# Patient Record
Sex: FEMALE | Race: ASIAN | NOT HISPANIC OR LATINO | Employment: FULL TIME | URBAN - METROPOLITAN AREA
[De-identification: names, ages, dates, MRNs, and addresses within clinical notes are randomized per-mention and may not be internally consistent; named-entity substitution may affect disease eponyms.]

---

## 2017-03-21 ENCOUNTER — LAB VISIT (OUTPATIENT)
Dept: LAB | Facility: OTHER | Age: 32
End: 2017-03-21
Attending: OBSTETRICS & GYNECOLOGY
Payer: COMMERCIAL

## 2017-03-21 ENCOUNTER — OFFICE VISIT (OUTPATIENT)
Dept: OBSTETRICS AND GYNECOLOGY | Facility: CLINIC | Age: 32
End: 2017-03-21
Payer: COMMERCIAL

## 2017-03-21 VITALS
DIASTOLIC BLOOD PRESSURE: 70 MMHG | BODY MASS INDEX: 31.24 KG/M2 | HEIGHT: 64 IN | WEIGHT: 183 LBS | SYSTOLIC BLOOD PRESSURE: 116 MMHG

## 2017-03-21 DIAGNOSIS — N91.2 AMENORRHEA: ICD-10-CM

## 2017-03-21 DIAGNOSIS — Z32.01 PREGNANCY CONFIRMED BY POSITIVE URINE TEST: ICD-10-CM

## 2017-03-21 DIAGNOSIS — Z32.01 PREGNANCY CONFIRMED BY POSITIVE URINE TEST: Primary | ICD-10-CM

## 2017-03-21 LAB
ABO + RH BLD: NORMAL
BASOPHILS # BLD AUTO: 0.03 K/UL
BASOPHILS NFR BLD: 0.3 %
BLD GP AB SCN CELLS X3 SERPL QL: NORMAL
DIFFERENTIAL METHOD: ABNORMAL
EOSINOPHIL # BLD AUTO: 0.1 K/UL
EOSINOPHIL NFR BLD: 1.3 %
ERYTHROCYTE [DISTWIDTH] IN BLOOD BY AUTOMATED COUNT: 15 %
HCT VFR BLD AUTO: 39.6 %
HGB BLD-MCNC: 13.1 G/DL
LYMPHOCYTES # BLD AUTO: 2.7 K/UL
LYMPHOCYTES NFR BLD: 29.5 %
MCH RBC QN AUTO: 25.5 PG
MCHC RBC AUTO-ENTMCNC: 33.1 %
MCV RBC AUTO: 77 FL
MONOCYTES # BLD AUTO: 0.5 K/UL
MONOCYTES NFR BLD: 5.3 %
NEUTROPHILS # BLD AUTO: 5.7 K/UL
NEUTROPHILS NFR BLD: 63.5 %
PLATELET # BLD AUTO: 236 K/UL
PMV BLD AUTO: 10.5 FL
RBC # BLD AUTO: 5.13 M/UL
WBC # BLD AUTO: 8.99 K/UL

## 2017-03-21 PROCEDURE — 86703 HIV-1/HIV-2 1 RESULT ANTBDY: CPT

## 2017-03-21 PROCEDURE — 87340 HEPATITIS B SURFACE AG IA: CPT

## 2017-03-21 PROCEDURE — 36415 COLL VENOUS BLD VENIPUNCTURE: CPT

## 2017-03-21 PROCEDURE — 83020 HEMOGLOBIN ELECTROPHORESIS: CPT

## 2017-03-21 PROCEDURE — 99999 PR PBB SHADOW E&M-NEW PATIENT-LVL III: CPT | Mod: PBBFAC,,, | Performed by: OBSTETRICS & GYNECOLOGY

## 2017-03-21 PROCEDURE — 85025 COMPLETE CBC W/AUTO DIFF WBC: CPT

## 2017-03-21 PROCEDURE — 87591 N.GONORRHOEAE DNA AMP PROB: CPT

## 2017-03-21 PROCEDURE — 81220 CFTR GENE COM VARIANTS: CPT

## 2017-03-21 PROCEDURE — 86592 SYPHILIS TEST NON-TREP QUAL: CPT

## 2017-03-21 PROCEDURE — 86900 BLOOD TYPING SEROLOGIC ABO: CPT

## 2017-03-21 PROCEDURE — 99385 PREV VISIT NEW AGE 18-39: CPT | Mod: S$GLB,,, | Performed by: OBSTETRICS & GYNECOLOGY

## 2017-03-21 PROCEDURE — 88175 CYTOPATH C/V AUTO FLUID REDO: CPT

## 2017-03-21 PROCEDURE — 86901 BLOOD TYPING SEROLOGIC RH(D): CPT

## 2017-03-21 PROCEDURE — 86762 RUBELLA ANTIBODY: CPT

## 2017-03-21 NOTE — PROGRESS NOTES
Assigned: Dating performed on 04/10/2017, based on the LMP  Assigned GA 8 w + 4 d  Assigned HERB: 11/16/2017      PT HERE WITH + UPT. LMP 02/09/17. FIRST ANNUAL EXAM  NO NAUSEA OR VOMITING FEELS WELL.  SLIGHT TIRED.    ROS:  GENERAL: No fever, chills, fatigability or weight loss.  VULVAR: No pain, no lesions and no itching.  VAGINAL: No relaxation, no itching, no discharge, no abnormal bleeding and no lesions.  ABDOMEN: No abdominal pain. Denies nausea. Denies vomiting. No diarrhea. No constipation  BREAST: Denies pain. No lumps. No discharge.  URINARY: No incontinence, no nocturia, no frequency and no dysuria.  CARDIOVASCULAR: No chest pain. No shortness of breath. No leg cramps.  NEUROLOGICAL: No headaches. No vision changes.  The remainder of the review of systems was negative.    PE:  General Appearance: overweight And Well developed. Well nourished. In no acute distress.  Vulva: Lesions: No.  Urethral Meatus: Normal size. Normal location. No lesions. No prolapse.  Urethra: No masses. No tenderness. No prolapse. No scarring.  Bladder: No masses. No tenderness.  Vagina: Mucosa NI:yes discharge no, atrophy no, cystocele no or rectocele no.  Cervix: Lesion: no  Stenotic: no Cervical motion tenderness: no  Uterus: Uterus size: 5 weeks. Support good. Uterus size: Normal  Adnexa: Masses: No Tenderness: No CDS Nodularity: No  Abdomen: overweight No masses. No tenderness.  Breasts: No bilateral masses. No bilateral discharge. No bilateral tenderness. No bilateral fibrocystic changes.  Neck: No thyroid enlargement. No thyroid masses.  Skin: Rashes: No  CHEST: CTA B  HEART: RRR  EXT: NO EDEMA        PROCEDURES:  UPT-    PLAN:     DIAGNOSIS:  1. Pregnancy confirmed by positive urine test    2. Amenorrhea        MEDICATIONS & ORDERS:  Orders Placed This Encounter    C. trachomatis/N. gonorrhoeae by AMP DNA Cervix    HIV-1 and HIV-2 antibodies    RPR    Hepatitis B surface antigen    Rubella antibody, IgG    CBC auto  differential    Cystic Fibrosis Screen    Hemoglobin Electrophoresis,Hgb A2 Amandeep.    Type & Screen    Liquid-based pap smear, screening    PNV 11-iron fum-folic acid-om3 (VIVA DHA) 28-1-200 mg Cap    US OB/GYN Procedure ([facility])         FOLLOW-UP: With me in 1 month

## 2017-03-22 LAB
C TRACH DNA SPEC QL NAA+PROBE: NOT DETECTED
HBV SURFACE AG SERPL QL IA: NEGATIVE
HGB A2 MFR BLD HPLC: 2.3 %
HGB FRACT BLD ELPH-IMP: NORMAL
HGB FRACT BLD ELPH-IMP: NORMAL
HIV 1+2 AB+HIV1 P24 AG SERPL QL IA: NEGATIVE
N GONORRHOEA DNA SPEC QL NAA+PROBE: NOT DETECTED
RPR SER QL: NORMAL
RUBV IGG SER-ACNC: 87.7 IU/ML
RUBV IGG SER-IMP: REACTIVE

## 2017-03-24 LAB — CFTR MUT ANL BLD/T: NORMAL

## 2017-03-27 ENCOUNTER — PATIENT MESSAGE (OUTPATIENT)
Dept: OBSTETRICS AND GYNECOLOGY | Facility: CLINIC | Age: 32
End: 2017-03-27

## 2017-04-10 ENCOUNTER — PROCEDURE VISIT (OUTPATIENT)
Dept: OBSTETRICS AND GYNECOLOGY | Facility: CLINIC | Age: 32
End: 2017-04-10
Payer: COMMERCIAL

## 2017-04-10 ENCOUNTER — PATIENT MESSAGE (OUTPATIENT)
Dept: OBSTETRICS AND GYNECOLOGY | Facility: CLINIC | Age: 32
End: 2017-04-10

## 2017-04-10 DIAGNOSIS — Z32.01 PREGNANCY CONFIRMED BY POSITIVE URINE TEST: ICD-10-CM

## 2017-04-10 PROCEDURE — 76815 OB US LIMITED FETUS(S): CPT | Mod: S$GLB,,, | Performed by: PEDIATRICS

## 2017-04-10 PROCEDURE — 76817 TRANSVAGINAL US OBSTETRIC: CPT | Mod: S$GLB,,, | Performed by: PEDIATRICS

## 2017-04-10 NOTE — PROCEDURES
Procedures     Indication  ========    Estimation of gestational age.    Method  ======    Transvaginal ultrasound examination. View: Sufficient.    Pregnancy  =========    Nelson pregnancy. Number of fetuses: 1.    Dating  ======    LMP on: 2/9/2017  GA by LMP 8 w + 4 d  HERB by LMP: 11/16/2017  Ultrasound examination on: 4/10/2017  GA by U/S based upon: CRL  GA by U/S 8 w + 0 d  HERB by U/S: 11/20/2017  Assigned: Dating performed on 04/10/2017, based on the LMP  Assigned GA 8 w + 4 d  Assigned HERB: 11/16/2017    Assessment  ==========    Gestational sac: visualized  Yolk sac: visualized  Embryo: visualized  CRL 15.6 mm 5% 8w 0d Hadlock  Cardiac activity: present   bpm    Maternal Structures  ===============    Uterus / Cervix  Uterus: Normal  Ovaries / Tubes / Adnexa  Rt ovary: Normal  Rt ovary D1 3.1 cm  Rt ovary D2 1.7 cm  Rt ovary D3 2.1 cm  Rt ovary mean 2.3 cm  Rt ovary vol 5.5 cm³  Lt ovary: Normal  Lt ovary D1 2.7 cm  Lt ovary D2 2.2 cm  Lt ovary D3 1.9 cm  Lt ovary mean 2.2 cm  Lt ovary vol 5.7 cm³    Impression  =========    Single viable intrauterine pregnancy consistent with LMP dating.  Embryo grossly WNL with normal cardiac activity.  Normal uterus, cervix and adnexae as noted above.  No fluid seen in cul-de-sac.    Recommendation  ==============    Follow-up as clinically indicated.

## 2017-04-15 ENCOUNTER — PATIENT MESSAGE (OUTPATIENT)
Dept: OBSTETRICS AND GYNECOLOGY | Facility: CLINIC | Age: 32
End: 2017-04-15

## 2017-04-17 RX ORDER — DOXYLAMINE SUCCINATE AND PYRIDOXINE HYDROCHLORIDE, DELAYED RELEASE TABLETS 10 MG/10 MG 10; 10 MG/1; MG/1
1 TABLET, DELAYED RELEASE ORAL 3 TIMES DAILY
Qty: 100 TABLET | Refills: 3 | Status: SHIPPED | OUTPATIENT
Start: 2017-04-17 | End: 2017-05-10

## 2017-04-18 ENCOUNTER — ROUTINE PRENATAL (OUTPATIENT)
Dept: OBSTETRICS AND GYNECOLOGY | Facility: CLINIC | Age: 32
End: 2017-04-18
Payer: COMMERCIAL

## 2017-04-18 VITALS — SYSTOLIC BLOOD PRESSURE: 110 MMHG | DIASTOLIC BLOOD PRESSURE: 60 MMHG | WEIGHT: 174.19 LBS | BODY MASS INDEX: 29.9 KG/M2

## 2017-04-18 DIAGNOSIS — Z34.90 PREGNANCY, UNSPECIFIED GESTATIONAL AGE: ICD-10-CM

## 2017-04-18 PROCEDURE — 0502F SUBSEQUENT PRENATAL CARE: CPT | Mod: S$GLB,,, | Performed by: OBSTETRICS & GYNECOLOGY

## 2017-04-18 PROCEDURE — 99999 PR PBB SHADOW E&M-EST. PATIENT-LVL I: CPT | Mod: PBBFAC,,, | Performed by: OBSTETRICS & GYNECOLOGY

## 2017-04-19 ENCOUNTER — PATIENT MESSAGE (OUTPATIENT)
Dept: OBSTETRICS AND GYNECOLOGY | Facility: CLINIC | Age: 32
End: 2017-04-19

## 2017-05-04 ENCOUNTER — LAB VISIT (OUTPATIENT)
Dept: LAB | Facility: OTHER | Age: 32
End: 2017-05-04
Attending: OBSTETRICS & GYNECOLOGY
Payer: COMMERCIAL

## 2017-05-04 ENCOUNTER — OFFICE VISIT (OUTPATIENT)
Dept: MATERNAL FETAL MEDICINE | Facility: CLINIC | Age: 32
End: 2017-05-04
Payer: COMMERCIAL

## 2017-05-04 VITALS — BODY MASS INDEX: 29.67 KG/M2 | WEIGHT: 172.81 LBS

## 2017-05-04 DIAGNOSIS — Z36.89 ENCOUNTER FOR FETAL ANATOMIC SURVEY: Primary | ICD-10-CM

## 2017-05-04 DIAGNOSIS — Z36.82 ENCOUNTER FOR (NT) NUCHAL TRANSLUCENCY SCAN: ICD-10-CM

## 2017-05-04 DIAGNOSIS — Z34.90 PREGNANCY, UNSPECIFIED GESTATIONAL AGE: ICD-10-CM

## 2017-05-04 DIAGNOSIS — Z36.82 ENCOUNTER FOR NUCHAL TRANSLUCENCY TESTING: ICD-10-CM

## 2017-05-04 PROCEDURE — 84163 PAPPA SERUM: CPT

## 2017-05-04 PROCEDURE — 76801 OB US < 14 WKS SINGLE FETUS: CPT | Mod: S$GLB,,, | Performed by: PEDIATRICS

## 2017-05-04 PROCEDURE — 76813 OB US NUCHAL MEAS 1 GEST: CPT | Mod: S$GLB,,, | Performed by: PEDIATRICS

## 2017-05-04 PROCEDURE — 99499 UNLISTED E&M SERVICE: CPT | Mod: S$GLB,,, | Performed by: PEDIATRICS

## 2017-05-04 PROCEDURE — 36415 COLL VENOUS BLD VENIPUNCTURE: CPT

## 2017-05-04 NOTE — PROGRESS NOTES
Indication  ========    First trimester screening.    Maternal Assessment  =================    Weight 78 kg  Weight (lb) 172 lb    Method  ======    Transabdominal ultrasound examination. View: Good view.    Pregnancy  =========    Nelson pregnancy. Number of fetuses: 1.    Dating  ======    Cycle: regular cycle  Ultrasound examination on: 5/4/2017  GA by U/S based upon: CRL  GA by U/S 12 w + 0 d  HERB by U/S: 11/16/2017  Assigned: Dating performed on 04/10/2017, based on the LMP  Assigned GA 12 w + 0 d  Assigned HERB: 11/16/2017    General Evaluation  ==============    Cardiac activity: present.  Amniotic fluid: normal amount.    Fetal Biometry  ============    CRL 53.1 mm 12w 0d Hadlock   bpm    Fetal Anatomy  ============     Urogenital tract: Bladder not visualized    The following structures appear normal:  Cranium. Neck. Thorax. Abdominal wall.    The following structures were visualized:  GI tract. Arms. Legs.    Maternal Structures  ===============    Uterus / Cervix  Uterus: Normal  Ovaries / Tubes / Adnexa  Rt ovary: Visualized  Rt ovary D1 3.2 cm  Rt ovary D2 1.8 cm  Rt ovary D3 2.1 cm  Rt ovary mean 2.3 cm  Rt ovary vol 6.1 cm³  Lt ovary: Visualized  Lt ovary D1 2.9 cm  Lt ovary D2 2.0 cm  Lt ovary D3 1.9 cm  Lt ovary mean 2.2 cm  Lt ovary vol 5.6 cm³  Pouch of Ryder / Other Structures  Cul de Sac: Visualized      Impression  =========    Fetal size is consistent with established dating, and normal fetal heart motion is seen. The nuchal translucency could not be accurately  measured for the Sequential Screen. A sample of maternal blood will be sent today for the first portion of the integrated screen.          Recommendation  ==============    First portion of integrated screening completed today. Results to be sent to primary pregnancy care provider. Recommend to complete second  blood draw beyond 15 weeks EGA of pregnancy. Ultrasound for fetal anatomical survey scheduled by [facility] today for  19-20 weeks EGA.    Thank you for allowing us to participate in the care of your patients. If you have any questions concerning today's consultation feel free to  contact me or one of my partners. We can be reached at [phone] during normal business hours. If you have a question after normal  business hours, please contact Labor and Delivery [phone] and the unit secretary will page our on call physician.

## 2017-05-08 ENCOUNTER — PATIENT MESSAGE (OUTPATIENT)
Dept: OBSTETRICS AND GYNECOLOGY | Facility: CLINIC | Age: 32
End: 2017-05-08

## 2017-05-08 LAB — MATERNAL INTEGRATED SCREEN PART 1: NORMAL

## 2017-05-10 ENCOUNTER — ROUTINE PRENATAL (OUTPATIENT)
Dept: OBSTETRICS AND GYNECOLOGY | Facility: CLINIC | Age: 32
End: 2017-05-10
Payer: COMMERCIAL

## 2017-05-10 VITALS
DIASTOLIC BLOOD PRESSURE: 68 MMHG | BODY MASS INDEX: 29.74 KG/M2 | SYSTOLIC BLOOD PRESSURE: 102 MMHG | WEIGHT: 173.31 LBS

## 2017-05-10 DIAGNOSIS — Z3A.12 12 WEEKS GESTATION OF PREGNANCY: Primary | ICD-10-CM

## 2017-05-10 PROCEDURE — 0502F SUBSEQUENT PRENATAL CARE: CPT | Mod: S$GLB,,, | Performed by: OBSTETRICS & GYNECOLOGY

## 2017-05-10 PROCEDURE — 99999 PR PBB SHADOW E&M-EST. PATIENT-LVL II: CPT | Mod: PBBFAC,,, | Performed by: OBSTETRICS & GYNECOLOGY

## 2017-05-10 RX ORDER — PROMETHAZINE HYDROCHLORIDE 25 MG/1
25 TABLET ORAL EVERY 4 HOURS
Qty: 30 TABLET | Refills: 3 | Status: SHIPPED | OUTPATIENT
Start: 2017-05-10 | End: 2017-09-15

## 2017-06-02 ENCOUNTER — PATIENT MESSAGE (OUTPATIENT)
Dept: OBSTETRICS AND GYNECOLOGY | Facility: CLINIC | Age: 32
End: 2017-06-02

## 2017-06-13 ENCOUNTER — ROUTINE PRENATAL (OUTPATIENT)
Dept: OBSTETRICS AND GYNECOLOGY | Facility: CLINIC | Age: 32
End: 2017-06-13
Payer: COMMERCIAL

## 2017-06-13 VITALS
BODY MASS INDEX: 30.27 KG/M2 | WEIGHT: 176.38 LBS | SYSTOLIC BLOOD PRESSURE: 110 MMHG | DIASTOLIC BLOOD PRESSURE: 70 MMHG

## 2017-06-13 DIAGNOSIS — Z3A.17 17 WEEKS GESTATION OF PREGNANCY: Primary | ICD-10-CM

## 2017-06-13 PROCEDURE — 99999 PR PBB SHADOW E&M-EST. PATIENT-LVL I: CPT | Mod: PBBFAC,,, | Performed by: OBSTETRICS & GYNECOLOGY

## 2017-06-13 PROCEDURE — 0502F SUBSEQUENT PRENATAL CARE: CPT | Mod: S$GLB,,, | Performed by: OBSTETRICS & GYNECOLOGY

## 2017-06-13 NOTE — PROGRESS NOTES
BACK FROM [place] 10 DAYS AGO. HAD SPOTTING AFTER FLIGHT AND YESTERDAY. NO CRAMPS. FEELS BETTER. EATING AND NO FOOD AVERSIONS.   SVE: LTC. NO LESION. NO D/C OR BLOOD. NT II

## 2017-06-16 ENCOUNTER — TELEPHONE (OUTPATIENT)
Dept: OBSTETRICS AND GYNECOLOGY | Facility: CLINIC | Age: 32
End: 2017-06-16

## 2017-06-16 NOTE — TELEPHONE ENCOUNTER
----- Message from Vicenta Zabala LPN sent at 6/15/2017 11:18 AM CDT -----  Contact: Patient      ----- Message -----  From: Flory Mendoza  Sent: 6/15/2017  10:19 AM  To: Silverio FERNANDEZ Jr Staff    x_ 1st Request  _ 2nd Request  _ 3rd Request    Who: ABIGAIL NANCE [[id number]]    Why: Patient is calling in regards to her refill prenatal vitamins Rx. Patient is needing the RX to be called in to her pharmacy. [facility] [address]. [phone]    What Number to Call Back: [phone]    When to Expect a call back: (Before the end of the day)  -- if call after 3:00 call back will be tomorrow.

## 2017-06-19 ENCOUNTER — PATIENT MESSAGE (OUTPATIENT)
Dept: OBSTETRICS AND GYNECOLOGY | Facility: CLINIC | Age: 32
End: 2017-06-19

## 2017-07-03 ENCOUNTER — OFFICE VISIT (OUTPATIENT)
Dept: MATERNAL FETAL MEDICINE | Facility: CLINIC | Age: 32
End: 2017-07-03
Payer: COMMERCIAL

## 2017-07-03 DIAGNOSIS — Z36.9 ENCOUNTER FOR FETAL ULTRASOUND: Primary | ICD-10-CM

## 2017-07-03 DIAGNOSIS — O26.879 SHORT CERVIX AFFECTING PREGNANCY: ICD-10-CM

## 2017-07-03 DIAGNOSIS — Z36.89 ENCOUNTER FOR FETAL ANATOMIC SURVEY: ICD-10-CM

## 2017-07-03 PROCEDURE — 76805 OB US >/= 14 WKS SNGL FETUS: CPT | Mod: S$GLB,,, | Performed by: OBSTETRICS & GYNECOLOGY

## 2017-07-03 PROCEDURE — 76817 TRANSVAGINAL US OBSTETRIC: CPT | Mod: S$GLB,,, | Performed by: OBSTETRICS & GYNECOLOGY

## 2017-07-03 PROCEDURE — 99243 OFF/OP CNSLTJ NEW/EST LOW 30: CPT | Mod: 25,S$GLB,, | Performed by: OBSTETRICS & GYNECOLOGY

## 2017-07-03 RX ORDER — PROGESTERONE 200 MG/1
200 CAPSULE ORAL NIGHTLY
Qty: 30 CAPSULE | Refills: 10 | Status: ON HOLD | OUTPATIENT
Start: 2017-07-03 | End: 2017-11-15 | Stop reason: HOSPADM

## 2017-07-03 NOTE — PROGRESS NOTES
Indication  ========    Fetal anatomy survey.    History  ======    General History  Other: CHANDRAKANT GODINEZ,  Previous Outcomes   1  Para 0    Pregnancy History  ==============    Maternal Lab Tests  Test: integrated Screen  Result: negative; 1:19,000  Wants to know gender: no    Method  ======    Transabdominal ultrasound examination. View: Good view.    Pregnancy  =========    Nelson pregnancy. Number of fetuses: 1.    Dating  ======    Cycle: regular cycle  Ultrasound examination on: 7/3/2017  GA by U/S based upon: AC, BPD, Femur, HC  GA by U/S 20 w + 0 d  HERB by U/S: 2017  Assigned: Dating performed on 04/10/2017, based on the LMP  Assigned GA 20 w + 4 d  Assigned HERB: 2017    General Evaluation  ==============    Cardiac activity: present.  bpm.  Fetal movements: visualized.  Presentation: cephalic.  Placenta:  Placental site: posterior.  Umbilical cord: Cord vessels: 3 vessel cord.  Amniotic fluid: Amount of AF: normal amount.    Fetal Biometry  ============    Fetal Biometry  BPD 47.4 mm 20w 2d Hadlock  OFD 61.2 mm 21w 0d John  .9 mm 20w 1d Hadlock  .7 mm 19w 3d Hadlock  Femur 33.0 mm 20w 2d Hadlock  Cerebellum tr 19.8 mm 19w 5d Us  CM 5.4 mm  Humerus 31.4 mm 20w 3d John   g 15% Sherwin  Calculated by: Hadlock (BPD-HC-AC-FL)  EFW (lb) 0 lb  EFW (oz) 11 oz  Cephalic index 0.77  HC / AC 1.25  FL / BPD 0.70  FL / AC 0.23   bpm  Head / Face / Neck   4.5 mm    Fetal Anatomy  ===========    Cranium: normal  Lateral ventricles: normal  Choroid plexus: normal  Midline falx: normal  Cavum septi pellucidi: normal  Cerebellum: normal  Cisterna magna: normal  Lips: normal  Profile: normal  Nose: normal  RVOT: normal  LVOT: normal  4-chamber view: 4-chamber normal, septum normal  Situs: normal  Aortic arch: normal  Ductal arch: suboptimal  SVC: suboptimal  IVC: suboptimal  3-vessel view: normal  Cord  insertion: normal  Stomach: normal  Kidneys: normal  Bladder: normal  Genitals: normal  Cervical spine: normal  Thoracic spine: normal  Lumbar spine: normal  Sacral spine: normal  Arms: both visualized  Legs: both visualized  Rt upper arm: normal  Rt forearm: normal  Rt hand: visualized  Rt hand: open  Lt upper arm: normal  Lt forearm: normal  Lt hand: visualized  Lt hand: closed  Rt upper leg: normal  Rt lower leg: normal  Rt foot: visualized  Rt foot: plantar surface wnl  Lt upper leg: normal  Lt lower leg: normal  Lt foot: visualized  Lt foot: plantar surface wnl  Wants to know gender: no    Maternal Structures  ===============    Uterus / Cervix  Funneling: Funneling present  Approach: w/o pressure  Cervical length 27.0 mm  Second approach: with pressure  Cervical length (2) 16.0 mm  Funnelling cerclage approach: Transvaginal  Cervical length with fundal pressure 16.0 mm  Funnel width with fundal pressure 13.0 mm  Funnel length with fundal pressure 6.0 mm  Ovaries / Tubes / Adnexa  Rt ovary: Visualized  Lt ovary: Visualized    Consultation  ==========    Type: Short Cervix.  The patient reported a small amount of vaginal spotting a few weeks ago and was examined by her primary ob. She denies having any vaginal  discharge, leakage of fluid, cramping, or contractions. She reports a rare pain more on left that radiates to vagina, similar to round ligament  pain. She denies any urinary symptoms. She denies having any cervical surgeries.    Speculum exam: No bulging membranes  Digital exam: Closed/50/high    We reviewed the potential implications of a short cervix. We discussed the increased risk of  birth. I reviewed with her the decreased  incidence of  birth in nulliparous women who were given nightly vaginal progesterone (prometrium 200mg). I also recommended to the  patient the avoidance of sexual intercourse and exertional activity. I reviewed the signs and symptoms of  labor in depth. We  discussed  the concepts of cervical insufficiency and  labor. We discussed that based on her current cervical length, closed cervix, and no history  of a prior  birth that she is not currently a cerclage candidate. The patient was advised of the thresholds of viability. We discussed that if  the cervix were to open or become extremely short, cerclage would be considered. The risks of cerclage were briefly reviewed. We also  discussed that cerclage is not typically placed beyond 23-24 weeks of gestation depending on clinical scenario. We also reviewed the   risks associated with  delivery.    The patient's job involves sitting and thus she can still continue with this.    A total of 30 minutes was spent in face to face time with greater than half of that time spent in counseling and coordination of care.        Impression  =========    Nelson live intrauterine pregnancy.  Biometry agrees with the clinical dating.  Normal amniotic fluid volume by qualitative assessment.  Placenta is posterior without evidence of previa.  Some of the anatomy was suboptimally visualized. The anatomy that was seen today appeared normal.  On transvaginal ultrasound, the cervix appeared short, measuring 16mm in its shortest length. A funnel was noted.    Recommendation  ==============    1. Follow up ultrasound in one week for cervical length.  2. Follow up ultrasound in 2 weeks for cervical length.  3. Follow up ultrasound in 4 weeks for growth and missing anatomy.  4. Prometrium 200mg per vagina qhs.

## 2017-07-03 NOTE — LETTER
July 3, 2017      Alan Sawyer Jr., MD  [address]  [address]  [place] [facility] [address]           [facility]  [address]  [facility] [address]  Phone: [phone]          Patient: Yvette Reyes   MR Number: [id number]   YOB: 1985   Date of Visit: 7/3/2017       Dear Dr. Alan Sawyer Jr.:    Thank you for referring Yvette Reyes to me for evaluation. Attached you will find relevant portions of my assessment and plan of care.    If you have questions, please do not hesitate to call me. I look forward to following Yvette Reyes along with you.    Sincerely,    Laurel Armstrong MD    Enclosure  CC:  No Recipients    If you would like to receive this communication electronically, please contact [email or web address] or [phone] to request more information on [employer] Link access.    For providers and/or their staff who would like to refer a patient to [facility], please contact us through our one-stop-shop provider referral line, [facility], at [phone].    If you feel you have received this communication in error or would no longer like to receive these types of communications, please e-mail [email or web address]

## 2017-07-05 ENCOUNTER — PATIENT MESSAGE (OUTPATIENT)
Dept: OBSTETRICS AND GYNECOLOGY | Facility: CLINIC | Age: 32
End: 2017-07-05

## 2017-07-10 ENCOUNTER — OFFICE VISIT (OUTPATIENT)
Dept: MATERNAL FETAL MEDICINE | Facility: CLINIC | Age: 32
End: 2017-07-10
Payer: COMMERCIAL

## 2017-07-10 DIAGNOSIS — O34.32 CERVICAL INCOMPETENCE AFFECTING MANAGEMENT OF PREGNANCY IN SECOND TRIMESTER, ANTEPARTUM: ICD-10-CM

## 2017-07-10 DIAGNOSIS — Z36.9 ENCOUNTER FOR FETAL ULTRASOUND: ICD-10-CM

## 2017-07-10 PROCEDURE — 76817 TRANSVAGINAL US OBSTETRIC: CPT | Mod: S$GLB,,, | Performed by: OBSTETRICS & GYNECOLOGY

## 2017-07-10 PROCEDURE — 76815 OB US LIMITED FETUS(S): CPT | Mod: S$GLB,,, | Performed by: OBSTETRICS & GYNECOLOGY

## 2017-07-10 PROCEDURE — 99213 OFFICE O/P EST LOW 20 MIN: CPT | Mod: 25,S$GLB,, | Performed by: OBSTETRICS & GYNECOLOGY

## 2017-07-10 NOTE — LETTER
July 10, 2017      Alan Sawyer Jr., MD  [address]  [address]  [place] [facility] [address]           [facility]  [address]  [facility] [address]  Phone: [phone]          Patient: Yvette Reyes   MR Number: [id number]   YOB: 1985   Date of Visit: 7/10/2017       Dear Dr. Alan Sawyer Jr.:    Thank you for referring Yvette Reyes to me for evaluation. Attached you will find relevant portions of my assessment and plan of care.    If you have questions, please do not hesitate to call me. I look forward to following Yvette Reyes along with you.    Sincerely,    Jared Branch MD    Enclosure  CC:  No Recipients    If you would like to receive this communication electronically, please contact [email or web address] or [phone] to request more information on [employer] Link access.    For providers and/or their staff who would like to refer a patient to [facility], please contact us through our one-stop-shop provider referral line, [facility], at [phone].    If you feel you have received this communication in error or would no longer like to receive these types of communications, please e-mail [email or web address]

## 2017-07-10 NOTE — PROGRESS NOTES
Indication  ========    Cervical shortening.    History  ======    General History  Other: CHANDRAKANT GODINEZ,  Previous Outcomes   1  Para 0    Pregnancy History  ==============    Maternal Lab Tests  Test: integrated Screen  Result: negative; 1:19,000  Wants to know gender: no    Method  ======    Transabdominal and transvaginal ultrasound examination. View: Sufficient.    Pregnancy  =========    Nelson pregnancy. Number of fetuses: 1.    Dating  ======    Cycle: regular cycle  Assigned: Dating performed on 04/10/2017, based on the LMP  Assigned GA 21 w + 4 d  Assigned HERB: 2017    General Evaluation  ==============    Cardiac activity: present.  bpm.  Fetal movements: visualized.  Presentation: cephalic.  Placenta: posterior.  Amniotic fluid: normal amount.    Fetal Biometry  ============    Fetal Biometry  Calculated by: Hadlock (BPD-HC-AC-FL)   bpm    Fetal Anatomy  ============    SVC: normal  IVC: normal  Wants to know gender: no  Other: A full anatomic survey has been previously performed.    Maternal Structures  ===============    Uterus / Cervix  Funneling: Funneling absent  Approach: w/o pressure  Cervical length 20.9 mm  Second approach: with pressure  Cervical length (2) 17.0 mm  Other: Small Y-shaped funnel noted but no evidence of cervical dilation or membrane prolapse.    Consultation  ==========    Ms. Reyes returned today for follow-up after her diagnosis of a short cervix last week. Last week or shortness cervical length was 16 mm  and she was started on vaginal progesterone. She denies any problems since starting that treatment. She has not had any increase in  discharge and when she had any contractions. She denies any bleeding. She denies any leakage of fluid.    On ultrasound today without fundal pressure her cervical length is 21 mm in with fundal pressure is 17 mm. There is a very small and shallow  Y-shaped funnel noted. There is no evidence of cervical dilation  nor any evidence of membrane prolapse.    I encouraged her to continue the vaginal progesterone. We discussed again precautions and warning signs that should prompt reevaluation.  We discussed her activities as well as she had a number of questions about her posture. I explained that at this point as long as there is not  evidence of cervical dilation and her cervical length remains greater than 10 mm I do not see any indication for consideration of cerclage. She  has a follow-up appointment for cervical length and approximately 1-2 weeks.    Thank you for allowing me to participate in her care. If I can be of any further assistance or answer any questions please do not hesitate to  call.    I overall spent approximately 15 minutes in face to face time with the patient and her family, greater than 50% of which was in counseling and  care coordination.      Impression  =========    Stable cervical length with a shortened cervix measuring 20-17 mm currently on vaginal progesterone. Cervix appears closed.    Recommendation  ==============    1. Continue vaginal progesterone.  2. Repeat ultrasound for cervical length already scheduled for 1-2 weeks.  3. Precautions reviewed.

## 2017-07-20 ENCOUNTER — ROUTINE PRENATAL (OUTPATIENT)
Dept: OBSTETRICS AND GYNECOLOGY | Facility: CLINIC | Age: 32
End: 2017-07-20
Payer: COMMERCIAL

## 2017-07-20 VITALS — WEIGHT: 183 LBS | DIASTOLIC BLOOD PRESSURE: 72 MMHG | SYSTOLIC BLOOD PRESSURE: 120 MMHG | BODY MASS INDEX: 31.41 KG/M2

## 2017-07-20 DIAGNOSIS — O26.872 SHORT CERVIX IN SECOND TRIMESTER, ANTEPARTUM: ICD-10-CM

## 2017-07-20 DIAGNOSIS — Z3A.23 23 WEEKS GESTATION OF PREGNANCY: Primary | ICD-10-CM

## 2017-07-20 PROCEDURE — 0502F SUBSEQUENT PRENATAL CARE: CPT | Mod: S$GLB,,, | Performed by: OBSTETRICS & GYNECOLOGY

## 2017-07-20 PROCEDURE — 99999 PR PBB SHADOW E&M-EST. PATIENT-LVL I: CPT | Mod: PBBFAC,,, | Performed by: OBSTETRICS & GYNECOLOGY

## 2017-07-26 ENCOUNTER — OFFICE VISIT (OUTPATIENT)
Dept: MATERNAL FETAL MEDICINE | Facility: CLINIC | Age: 32
End: 2017-07-26
Payer: COMMERCIAL

## 2017-07-26 DIAGNOSIS — Z36.9 ENCOUNTER FOR FETAL ULTRASOUND: ICD-10-CM

## 2017-07-26 DIAGNOSIS — O26.872 SHORT CERVIX IN SECOND TRIMESTER, ANTEPARTUM: ICD-10-CM

## 2017-07-26 PROCEDURE — 76815 OB US LIMITED FETUS(S): CPT | Mod: S$GLB,,, | Performed by: OBSTETRICS & GYNECOLOGY

## 2017-07-26 PROCEDURE — 99213 OFFICE O/P EST LOW 20 MIN: CPT | Mod: 25,S$GLB,, | Performed by: OBSTETRICS & GYNECOLOGY

## 2017-07-26 PROCEDURE — 76817 TRANSVAGINAL US OBSTETRIC: CPT | Mod: S$GLB,,, | Performed by: OBSTETRICS & GYNECOLOGY

## 2017-07-26 NOTE — LETTER
July 28, 2017      Alan Sawyer Jr., MD  [address]  [address]  [place] [facility] [address]           [facility]  [address]  [facility] [address]  Phone: [phone]          Patient: Yvette Reyes   MR Number: [id number]   YOB: 1985   Date of Visit: 7/26/2017       Dear Dr. Alan Sawyer Jr.:    Thank you for referring Yvette Reyes to me for evaluation. Attached you will find relevant portions of my assessment and plan of care.    If you have questions, please do not hesitate to call me. I look forward to following Yvette Reyes along with you.    Sincerely,    Laurel Armstrong MD    Enclosure  CC:  No Recipients    If you would like to receive this communication electronically, please contact [email or web address] or [phone] to request more information on [employer] Link access.    For providers and/or their staff who would like to refer a patient to [facility], please contact us through our one-stop-shop provider referral line, [facility], at [phone].    If you feel you have received this communication in error or would no longer like to receive these types of communications, please e-mail [email or web address]

## 2017-07-28 NOTE — PROGRESS NOTES
Indication  ========    cervical length.    History  ======    General History  Other: CHANDRAKANT GODINEZ,  Previous Outcomes   1  Para 0    Pregnancy History  ==============    Maternal Lab Tests  Test: integrated Screen  Result: negative; 1:19,000  Wants to know gender: no    Method  ======    Transabdominal and transvaginal ultrasound examination, [place] E10. View: Sufficient.    Pregnancy  =========    Nelson pregnancy. Number of fetuses: 1.    Dating  ======    Cycle: regular cycle  Assigned: Dating performed on 04/10/2017, based on the LMP  Assigned GA 23 w + 6 d  Assigned HERB: 2017    General Evaluation  ==============    Cardiac activity: present.  bpm.  Fetal movements: visualized.  Presentation: cephalic.  Placenta:  Placental site: posterior.  Umbilical cord: Cord vessels: 3 vessel cord. Cord insertion: placental insertion: normal.  Amniotic fluid: Amount of AF: normal amount. MVP 5.9 cm.    Fetal Biometry  ============    Fetal Biometry  Calculated by: Hadlock (BPD-HC-AC-FL)  MVP 5.9 cm   bpm    Maternal Structures  ===============    Uterus / Cervix  Funneling: Funneling present  Cervix: Visualized  Approach: w/o pressure  Cervical length 16.4 mm  Funnelling cerclage approach: Transvaginal  Cervical length with fundal pressure 19.5 mm  Funnel width with fundal pressure 8.4 mm  Funnel width without fundal pressure 7.4 mm  Funnel length with fundal pressure 5.4 mm  Funnel length without fundal pressure 5.2 mm    Consultation  ==========    Type: Follow up short cervix.  The patient presents for follow up for a short cervix. She has no  labor complaints. She is doing well on vaginal progesterone. She  denies any vaginal bleeding, leakage of fluid, cramping, contractions, or increase in discharge.    Cvx digitally closed/50%/high    We discussed that given her gestational age that no further cervical lengths are needed. Treatment would be based on clinical signs and  symptoms  and physical exam. Continue vaginal progesterone and pelvic rest. PTL precautions given. Cerv ical length is stable and with closed  cervix and current cervical length, she is not a cerclage candidate.    A total of 15 minutes was spent with greater than half of that time spent in counseling and coordination of care.    Impression  =========    Nelson live intrauterine pregnancy.  Normal amniotic fluid volume.  Limited exam today for cervical length. The residual cervical length is stable from prior exams. The funnel is smaller than at her anatomy  ultrasound and not significantly different in appearance from her 7/10/17 ultrasound.    Recommendation  ==============    Follow up ultrasound in 2 weeks for growth and completion of anatomy.

## 2017-08-14 ENCOUNTER — OFFICE VISIT (OUTPATIENT)
Dept: MATERNAL FETAL MEDICINE | Facility: CLINIC | Age: 32
End: 2017-08-14
Attending: OBSTETRICS & GYNECOLOGY
Payer: COMMERCIAL

## 2017-08-14 DIAGNOSIS — Z36.4 ANTENATAL SCREENING FOR FETAL GROWTH RETARDATION USING ULTRASONICS: ICD-10-CM

## 2017-08-14 DIAGNOSIS — Z36.9 ENCOUNTER FOR FETAL ULTRASOUND: ICD-10-CM

## 2017-08-14 DIAGNOSIS — O99.891 CURRENT MATERNAL CONDITION AFFECTING PREGNANCY: ICD-10-CM

## 2017-08-14 DIAGNOSIS — O26.872 SHORT CERVIX IN SECOND TRIMESTER, ANTEPARTUM: ICD-10-CM

## 2017-08-14 PROCEDURE — 76816 OB US FOLLOW-UP PER FETUS: CPT | Mod: S$GLB,,, | Performed by: OBSTETRICS & GYNECOLOGY

## 2017-08-14 PROCEDURE — 99499 UNLISTED E&M SERVICE: CPT | Mod: S$GLB,,, | Performed by: OBSTETRICS & GYNECOLOGY

## 2017-08-14 NOTE — PROGRESS NOTES
Indication  ========    Follow-up evaluation of anatomy DA, and growth (Dr. Godinez).    History  ======    General History  Other: CHANDRAKANT GODINEZ,  Previous Outcomes   1  Para 0    Pregnancy History  ==============    Maternal Lab Tests  Test: integrated Screen  Result: negative; 1:19,000  Wants to know gender: yes    Method  ======    Transabdominal ultrasound examination. View: Sufficient.    Pregnancy  =========    Nelson pregnancy. Number of fetuses: 1.    Dating  ======    Cycle: regular cycle  Ultrasound examination on: 2017  GA by U/S based upon: AC, BPD, Femur, HC  GA by U/S 25 w + 2 d  HERB by U/S: 2017  Assigned: Dating performed on 04/10/2017, based on the LMP  Assigned GA 26 w + 4 d  Assigned HERB: 2017    General Evaluation  ==============    Cardiac activity: present.  bpm.  Fetal movements: visualized.  Presentation: breech.  Placenta:  Placental site: posterior.  Umbilical cord: Cord vessels: 3 vessel cord.  Amniotic fluid: Amount of AF: normal amount. MVP 4.9 cm.    Fetal Biometry  ============    Fetal Biometry  BPD 60.6 mm 24w 5d Hadlock  OFD 84.7 mm 27w 3d John  .9 mm 25w 4d Hadlock  .6 mm 25w 2d Hadlock  Femur 46.9 mm 25w 4d Hadlock  CM 4.9 mm   g 24% Sherwin  Calculated by: Hadlock (BPD-HC-AC-FL)  EFW (lb) 1 lb  EFW (oz) 12 oz  Cephalic index 0.72  HC / AC 1.13  FL / BPD 0.77  FL / AC 0.23  MVP 4.9 cm   bpm  Head / Face / Neck   3.5 mm    Fetal Anatomy  ============    Cranium: normal  Lateral ventricles: normal  Cerebellum: normal  Cisterna magna: normal  Lips: normal  Nose: normal  4-chamber view: 4-chamber normal, septum normal  Aortic arch: normal  Ductal arch: normal  Stomach: normal  Kidneys: normal  Bladder: normal  Genitals: normal  Gender: male  Wants to know gender: yes    Impression  =========    1. 26w 4d nelson intrauterine pregnancy  2. normal interval fetal growth with EFW = 807 gms at 24%  3. Amniotic fluid volume  appears wnl per qualitative assessment  4. Limited f/u anatomy: no abnormalities appreciated .    Recommendation  ==============    we will schedule patient for a f/u sono in about 6 wks for interval fetal growth, MVP, and overall fetal well being .

## 2017-08-14 NOTE — LETTER
August 14, 2017      Laurel Armstrong MD  [address]  [address]  [facility] [address]           [facility]  [address]  [facility] [address]  Phone: [phone]          Patient: Yvette Reyes   MR Number: [id number]   YOB: 1985   Date of Visit: 8/14/2017       Dear Dr. Laurel Armstrong:    Thank you for referring Yvette Reyes to me for evaluation. Attached you will find relevant portions of my assessment and plan of care.    If you have questions, please do not hesitate to call me. I look forward to following Yvette Reyes along with you.    Sincerely,    Latisha Villarreal MD    Enclosure  CC:  No Recipients    If you would like to receive this communication electronically, please contact [email or web address] or [phone] to request more information on [facility] access.    For providers and/or their staff who would like to refer a patient to [facility], please contact us through our one-stop-shop provider referral line, [facility], at [phone].    If you feel you have received this communication in error or would no longer like to receive these types of communications, please e-mail [email or web address]

## 2017-08-17 ENCOUNTER — LAB VISIT (OUTPATIENT)
Dept: LAB | Facility: OTHER | Age: 32
End: 2017-08-17
Attending: OBSTETRICS & GYNECOLOGY
Payer: COMMERCIAL

## 2017-08-17 ENCOUNTER — ROUTINE PRENATAL (OUTPATIENT)
Dept: OBSTETRICS AND GYNECOLOGY | Facility: CLINIC | Age: 32
End: 2017-08-17
Payer: COMMERCIAL

## 2017-08-17 ENCOUNTER — CLINICAL SUPPORT (OUTPATIENT)
Dept: OBSTETRICS AND GYNECOLOGY | Facility: CLINIC | Age: 32
End: 2017-08-17
Payer: COMMERCIAL

## 2017-08-17 VITALS
BODY MASS INDEX: 32.92 KG/M2 | SYSTOLIC BLOOD PRESSURE: 110 MMHG | WEIGHT: 191.81 LBS | DIASTOLIC BLOOD PRESSURE: 72 MMHG

## 2017-08-17 DIAGNOSIS — Z3A.27 27 WEEKS GESTATION OF PREGNANCY: Primary | ICD-10-CM

## 2017-08-17 DIAGNOSIS — Z3A.23 23 WEEKS GESTATION OF PREGNANCY: ICD-10-CM

## 2017-08-17 LAB
BASOPHILS # BLD AUTO: 0.03 K/UL
BASOPHILS NFR BLD: 0.3 %
DIFFERENTIAL METHOD: ABNORMAL
EOSINOPHIL # BLD AUTO: 0.1 K/UL
EOSINOPHIL NFR BLD: 1 %
ERYTHROCYTE [DISTWIDTH] IN BLOOD BY AUTOMATED COUNT: 14.8 %
GLUCOSE SERPL-MCNC: 113 MG/DL
HCT VFR BLD AUTO: 37.1 %
HGB BLD-MCNC: 12 G/DL
LYMPHOCYTES # BLD AUTO: 2.3 K/UL
LYMPHOCYTES NFR BLD: 19.9 %
MCH RBC QN AUTO: 27 PG
MCHC RBC AUTO-ENTMCNC: 32.3 G/DL
MCV RBC AUTO: 83 FL
MONOCYTES # BLD AUTO: 0.8 K/UL
MONOCYTES NFR BLD: 6.4 %
NEUTROPHILS # BLD AUTO: 8.4 K/UL
NEUTROPHILS NFR BLD: 71.9 %
PLATELET # BLD AUTO: 195 K/UL
PMV BLD AUTO: 11 FL
RBC # BLD AUTO: 4.45 M/UL
WBC # BLD AUTO: 11.68 K/UL

## 2017-08-17 PROCEDURE — 99999 PR PBB SHADOW E&M-EST. PATIENT-LVL II: CPT | Mod: PBBFAC,,, | Performed by: OBSTETRICS & GYNECOLOGY

## 2017-08-17 PROCEDURE — 99999 PR PBB SHADOW E&M-EST. PATIENT-LVL I: CPT | Mod: PBBFAC,,,

## 2017-08-17 PROCEDURE — 0502F SUBSEQUENT PRENATAL CARE: CPT | Mod: S$GLB,,, | Performed by: OBSTETRICS & GYNECOLOGY

## 2017-08-17 PROCEDURE — 85025 COMPLETE CBC W/AUTO DIFF WBC: CPT

## 2017-08-17 PROCEDURE — 36415 COLL VENOUS BLD VENIPUNCTURE: CPT

## 2017-08-17 PROCEDURE — 90715 TDAP VACCINE 7 YRS/> IM: CPT | Mod: S$GLB,,, | Performed by: OBSTETRICS & GYNECOLOGY

## 2017-08-17 PROCEDURE — 82950 GLUCOSE TEST: CPT

## 2017-08-17 PROCEDURE — 90471 IMMUNIZATION ADMIN: CPT | Mod: S$GLB,,, | Performed by: OBSTETRICS & GYNECOLOGY

## 2017-09-08 ENCOUNTER — ROUTINE PRENATAL (OUTPATIENT)
Dept: OBSTETRICS AND GYNECOLOGY | Facility: CLINIC | Age: 32
End: 2017-09-08
Payer: COMMERCIAL

## 2017-09-08 VITALS
DIASTOLIC BLOOD PRESSURE: 70 MMHG | BODY MASS INDEX: 33.53 KG/M2 | WEIGHT: 195.31 LBS | SYSTOLIC BLOOD PRESSURE: 114 MMHG

## 2017-09-08 DIAGNOSIS — Z3A.30 30 WEEKS GESTATION OF PREGNANCY: Primary | ICD-10-CM

## 2017-09-08 PROCEDURE — 0502F SUBSEQUENT PRENATAL CARE: CPT | Mod: S$GLB,,, | Performed by: OBSTETRICS & GYNECOLOGY

## 2017-09-08 PROCEDURE — 99999 PR PBB SHADOW E&M-EST. PATIENT-LVL II: CPT | Mod: PBBFAC,,, | Performed by: OBSTETRICS & GYNECOLOGY

## 2017-09-08 NOTE — PROGRESS NOTES
HAD A CALF CRAMP 3 NIGHTS AGO. ? HARD LUMP IN PERINEAL AREA    PE:  General Appearance: overweight And Well developed. Well nourished. In no acute distress.  Vulva: Lesions: YES        OPTION OF DRAINAGE OR EXP MGMT. PT WISHES TO WAIT

## 2017-09-11 ENCOUNTER — OFFICE VISIT (OUTPATIENT)
Dept: MATERNAL FETAL MEDICINE | Facility: CLINIC | Age: 32
End: 2017-09-11
Payer: COMMERCIAL

## 2017-09-11 DIAGNOSIS — Z03.74 ENCOUNTER FOR OBSERVATION OF SUSPECTED PROBLEM WITH FETAL GROWTH NOT FOUND: ICD-10-CM

## 2017-09-11 DIAGNOSIS — Z36.9 ENCOUNTER FOR FETAL ULTRASOUND: ICD-10-CM

## 2017-09-11 PROCEDURE — 76816 OB US FOLLOW-UP PER FETUS: CPT | Mod: S$GLB,,, | Performed by: OBSTETRICS & GYNECOLOGY

## 2017-09-11 PROCEDURE — 99499 UNLISTED E&M SERVICE: CPT | Mod: S$GLB,,, | Performed by: OBSTETRICS & GYNECOLOGY

## 2017-09-11 NOTE — PROGRESS NOTES
Indication  ========    Evaluation of fetal growth (AC).    History  ======    General History  Other: CHANDRAKANT GODINEZ,  Previous Outcomes   1  Para 0    Pregnancy History  ==============    Maternal Lab Tests  Test: integrated Screen  Result: negative; 1:19,000  Wants to know gender: yes    Method  ======    Transabdominal ultrasound examination. View: Sufficient.    Pregnancy  =========    Nelson pregnancy. Number of fetuses: 1.    Dating  ======    Cycle: regular cycle  Ultrasound examination on: 2017  GA by U/S based upon: AC, BPD, Femur, HC  GA by U/S 30 w + 1 d  HERB by U/S: 2017  Assigned: Dating performed on 04/10/2017, based on the LMP  Assigned GA 30 w + 4 d  Assigned HERB: 2017    General Evaluation  ==============    Cardiac activity: present.  bpm.  Fetal movements: visualized.  Presentation: cephalic.  Placenta: posterior.  Umbilical cord: 3 vessel cord.  Amniotic fluid: MVP 4.8 cm.    Fetal Biometry  ============    Fetal Biometry  BPD 76.4 mm 30w 5d Hadlock  OFD 99.2 mm 32w 0d John  .1 mm 30w 6d Hadlock  .9 mm 28w 6d Hadlock  Femur 57.7 mm 30w 1d Hadlock  EFW 1,433 g 22% Sherwin  Calculated by: Hadlock (BPD-HC-AC-FL)  EFW (lb) 3 lb  EFW (oz) 3 oz  Cephalic index 0.77  HC / AC 1.14  FL / BPD 0.76  FL / AC 0.23  MVP 4.8 cm   bpm  Head / Face / Neck   3.0 mm    Fetal Anatomy  ===========    Cranium: normal  Stomach: normal  Kidneys: normal  Bladder: normal  Gender: male  Wants to know gender: yes    Impression  =========    The interval fetal growth has been appropriate and the estimated fetal weight plots at the 22nd percentile for gestational age.  The amniotic fluid volume is normal.    Recommendation  ==============    Repeat ultrasound study as clinically indicated.

## 2017-09-15 ENCOUNTER — PATIENT MESSAGE (OUTPATIENT)
Dept: OBSTETRICS AND GYNECOLOGY | Facility: CLINIC | Age: 32
End: 2017-09-15

## 2017-09-15 ENCOUNTER — OFFICE VISIT (OUTPATIENT)
Dept: OBSTETRICS AND GYNECOLOGY | Facility: CLINIC | Age: 32
End: 2017-09-15
Payer: COMMERCIAL

## 2017-09-15 VITALS
WEIGHT: 196.56 LBS | SYSTOLIC BLOOD PRESSURE: 130 MMHG | DIASTOLIC BLOOD PRESSURE: 78 MMHG | BODY MASS INDEX: 33.74 KG/M2

## 2017-09-15 DIAGNOSIS — Z34.93 ENCOUNTER FOR SUPERVISION OF NORMAL PREGNANCY IN THIRD TRIMESTER, UNSPECIFIED GRAVIDITY: Primary | ICD-10-CM

## 2017-09-15 PROCEDURE — 99999 PR PBB SHADOW E&M-EST. PATIENT-LVL II: CPT | Mod: PBBFAC,,,

## 2017-09-15 PROCEDURE — 0502F SUBSEQUENT PRENATAL CARE: CPT | Mod: S$GLB,,, | Performed by: ADVANCED PRACTICE MIDWIFE

## 2017-09-15 NOTE — PROGRESS NOTES
Pt into today for eval sec to episode of bleeding earlier today. Pt reports that she wnt to the bathroom and when she stood up there e was blood on the floor and blood when she wiped. Pt denies LOF or cramping. Pt reports FM. Spec exam with no blood noted in vaginal vault, cervix closed. Skin cyst at lower edge of R vulvar area has drained, bleeding noted at site. Discussed all findings with pt and . Discussed monitor cyst, gentle wash with Dial soap and topical application of neosporin BID. RTC if worsens.

## 2017-09-29 ENCOUNTER — ROUTINE PRENATAL (OUTPATIENT)
Dept: OBSTETRICS AND GYNECOLOGY | Facility: CLINIC | Age: 32
End: 2017-09-29
Payer: COMMERCIAL

## 2017-09-29 VITALS
BODY MASS INDEX: 34.06 KG/M2 | WEIGHT: 198.44 LBS | DIASTOLIC BLOOD PRESSURE: 70 MMHG | SYSTOLIC BLOOD PRESSURE: 120 MMHG

## 2017-09-29 DIAGNOSIS — Z3A.33 33 WEEKS GESTATION OF PREGNANCY: Primary | ICD-10-CM

## 2017-09-29 PROCEDURE — 99999 PR PBB SHADOW E&M-EST. PATIENT-LVL I: CPT | Mod: PBBFAC,,, | Performed by: OBSTETRICS & GYNECOLOGY

## 2017-09-29 PROCEDURE — 0502F SUBSEQUENT PRENATAL CARE: CPT | Mod: S$GLB,,, | Performed by: OBSTETRICS & GYNECOLOGY

## 2017-10-13 ENCOUNTER — LAB VISIT (OUTPATIENT)
Dept: LAB | Facility: OTHER | Age: 32
End: 2017-10-13
Attending: OBSTETRICS & GYNECOLOGY
Payer: COMMERCIAL

## 2017-10-13 ENCOUNTER — IMMUNIZATION (OUTPATIENT)
Dept: OBSTETRICS AND GYNECOLOGY | Facility: CLINIC | Age: 32
End: 2017-10-13
Payer: COMMERCIAL

## 2017-10-13 ENCOUNTER — ROUTINE PRENATAL (OUTPATIENT)
Dept: OBSTETRICS AND GYNECOLOGY | Facility: CLINIC | Age: 32
End: 2017-10-13
Payer: COMMERCIAL

## 2017-10-13 VITALS
DIASTOLIC BLOOD PRESSURE: 74 MMHG | BODY MASS INDEX: 34.63 KG/M2 | SYSTOLIC BLOOD PRESSURE: 118 MMHG | WEIGHT: 201.75 LBS

## 2017-10-13 DIAGNOSIS — Z3A.35 35 WEEKS GESTATION OF PREGNANCY: ICD-10-CM

## 2017-10-13 DIAGNOSIS — Z3A.35 35 WEEKS GESTATION OF PREGNANCY: Primary | ICD-10-CM

## 2017-10-13 LAB — RPR SER QL: NORMAL

## 2017-10-13 PROCEDURE — 87184 SC STD DISK METHOD PER PLATE: CPT

## 2017-10-13 PROCEDURE — 86592 SYPHILIS TEST NON-TREP QUAL: CPT

## 2017-10-13 PROCEDURE — 87147 CULTURE TYPE IMMUNOLOGIC: CPT

## 2017-10-13 PROCEDURE — 99999 PR PBB SHADOW E&M-EST. PATIENT-LVL III: CPT | Mod: PBBFAC,,, | Performed by: OBSTETRICS & GYNECOLOGY

## 2017-10-13 PROCEDURE — 87081 CULTURE SCREEN ONLY: CPT

## 2017-10-13 PROCEDURE — 0502F SUBSEQUENT PRENATAL CARE: CPT | Mod: S$GLB,,, | Performed by: OBSTETRICS & GYNECOLOGY

## 2017-10-13 PROCEDURE — 90686 IIV4 VACC NO PRSV 0.5 ML IM: CPT | Mod: S$GLB,,, | Performed by: OBSTETRICS & GYNECOLOGY

## 2017-10-13 PROCEDURE — 86703 HIV-1/HIV-2 1 RESULT ANTBDY: CPT

## 2017-10-13 PROCEDURE — 90471 IMMUNIZATION ADMIN: CPT | Mod: S$GLB,,, | Performed by: OBSTETRICS & GYNECOLOGY

## 2017-10-13 PROCEDURE — 36415 COLL VENOUS BLD VENIPUNCTURE: CPT

## 2017-10-13 NOTE — PROGRESS NOTES
WORRIED ABOUT THE SKIN CYST ON R VULVA, AS IT HAS FORMED AGAIN AND RUPTURED 2 DAYS AGO.  GBBS / LABS / FLU

## 2017-10-16 PROBLEM — J02.0 GBBS (GROUP B BETA HEMOLYTIC STREPTOCOCCUS) PHARYNGITIS: Status: ACTIVE | Noted: 2017-10-16

## 2017-10-16 PROBLEM — B95.1 GBBS (GROUP B BETA HEMOLYTIC STREPTOCOCCUS) PHARYNGITIS: Status: ACTIVE | Noted: 2017-10-16

## 2017-10-16 LAB — HIV 1+2 AB+HIV1 P24 AG SERPL QL IA: NEGATIVE

## 2017-10-17 ENCOUNTER — PATIENT MESSAGE (OUTPATIENT)
Dept: OBSTETRICS AND GYNECOLOGY | Facility: CLINIC | Age: 32
End: 2017-10-17

## 2017-10-17 LAB — BACTERIA SPEC AEROBE CULT: NORMAL

## 2017-10-20 ENCOUNTER — ROUTINE PRENATAL (OUTPATIENT)
Dept: OBSTETRICS AND GYNECOLOGY | Facility: CLINIC | Age: 32
End: 2017-10-20
Payer: COMMERCIAL

## 2017-10-20 VITALS
DIASTOLIC BLOOD PRESSURE: 74 MMHG | BODY MASS INDEX: 35.12 KG/M2 | SYSTOLIC BLOOD PRESSURE: 116 MMHG | WEIGHT: 204.56 LBS

## 2017-10-20 DIAGNOSIS — Z3A.36 36 WEEKS GESTATION OF PREGNANCY: Primary | ICD-10-CM

## 2017-10-20 PROCEDURE — 99999 PR PBB SHADOW E&M-EST. PATIENT-LVL II: CPT | Mod: PBBFAC,,, | Performed by: OBSTETRICS & GYNECOLOGY

## 2017-10-20 PROCEDURE — 0502F SUBSEQUENT PRENATAL CARE: CPT | Mod: S$GLB,,, | Performed by: OBSTETRICS & GYNECOLOGY

## 2017-10-22 ENCOUNTER — PATIENT MESSAGE (OUTPATIENT)
Dept: OBSTETRICS AND GYNECOLOGY | Facility: CLINIC | Age: 32
End: 2017-10-22

## 2017-10-24 ENCOUNTER — TELEPHONE (OUTPATIENT)
Dept: OBSTETRICS AND GYNECOLOGY | Facility: CLINIC | Age: 32
End: 2017-10-24

## 2017-10-24 NOTE — TELEPHONE ENCOUNTER
----- Message from Ana Weeks sent at 10/24/2017  1:54 PM CDT -----  Contact: pt  _x  1st Request  _  2nd Request  _  3rd Request      Who:pt    Why: pt calling in regards to her [facility] paperwork     What Number to Call Back: [phone]    When to Expect a call back: (Before the end of the day)   -- if call after 3:00 call back will be tomorrow.

## 2017-10-27 ENCOUNTER — ROUTINE PRENATAL (OUTPATIENT)
Dept: OBSTETRICS AND GYNECOLOGY | Facility: CLINIC | Age: 32
End: 2017-10-27
Payer: COMMERCIAL

## 2017-10-27 VITALS
SYSTOLIC BLOOD PRESSURE: 120 MMHG | DIASTOLIC BLOOD PRESSURE: 70 MMHG | BODY MASS INDEX: 35.57 KG/M2 | WEIGHT: 207.25 LBS

## 2017-10-27 DIAGNOSIS — Z3A.37 37 WEEKS GESTATION OF PREGNANCY: Primary | ICD-10-CM

## 2017-10-27 PROCEDURE — 0502F SUBSEQUENT PRENATAL CARE: CPT | Mod: S$GLB,,, | Performed by: OBSTETRICS & GYNECOLOGY

## 2017-10-27 PROCEDURE — 99999 PR PBB SHADOW E&M-EST. PATIENT-LVL I: CPT | Mod: PBBFAC,,, | Performed by: OBSTETRICS & GYNECOLOGY

## 2017-11-02 ENCOUNTER — ROUTINE PRENATAL (OUTPATIENT)
Dept: OBSTETRICS AND GYNECOLOGY | Facility: CLINIC | Age: 32
End: 2017-11-02
Payer: COMMERCIAL

## 2017-11-02 VITALS
WEIGHT: 208.69 LBS | BODY MASS INDEX: 35.82 KG/M2 | DIASTOLIC BLOOD PRESSURE: 74 MMHG | SYSTOLIC BLOOD PRESSURE: 126 MMHG

## 2017-11-02 DIAGNOSIS — N89.8 VAGINAL CYST: ICD-10-CM

## 2017-11-02 DIAGNOSIS — Z34.80 SUPERVISION OF OTHER NORMAL PREGNANCY, ANTEPARTUM: Primary | ICD-10-CM

## 2017-11-02 PROCEDURE — 99999 PR PBB SHADOW E&M-EST. PATIENT-LVL II: CPT | Mod: PBBFAC,,,

## 2017-11-02 PROCEDURE — 0502F SUBSEQUENT PRENATAL CARE: CPT | Mod: S$GLB,,, | Performed by: OBSTETRICS & GYNECOLOGY

## 2017-11-02 PROCEDURE — 87070 CULTURE OTHR SPECIMN AEROBIC: CPT

## 2017-11-02 NOTE — PROGRESS NOTES
Here for routine OB appt at 38w0d, with complaints of drainage to vaginal cyst.  Culture obtained.  No surrounding erythema, normal induration.  Reports area is tender to palpation/ wiping .  Reports good FM.  Denies LOF, denies VB, denies contractions.  Reviewed warning signs of Labor and Preeclampsia.  Daily FM counts reinforced.  Paxton- Rodger Moses.  Plans to breastfeed.   F/U scheduled 1 week

## 2017-11-04 LAB — BACTERIA SPEC AEROBE CULT: NORMAL

## 2017-11-07 ENCOUNTER — PATIENT MESSAGE (OUTPATIENT)
Dept: OBSTETRICS AND GYNECOLOGY | Facility: CLINIC | Age: 32
End: 2017-11-07

## 2017-11-10 ENCOUNTER — ROUTINE PRENATAL (OUTPATIENT)
Dept: OBSTETRICS AND GYNECOLOGY | Facility: CLINIC | Age: 32
End: 2017-11-10
Payer: COMMERCIAL

## 2017-11-10 VITALS
DIASTOLIC BLOOD PRESSURE: 72 MMHG | WEIGHT: 211.63 LBS | SYSTOLIC BLOOD PRESSURE: 120 MMHG | BODY MASS INDEX: 36.33 KG/M2

## 2017-11-10 DIAGNOSIS — Z3A.39 39 WEEKS GESTATION OF PREGNANCY: Primary | ICD-10-CM

## 2017-11-10 PROCEDURE — 0502F SUBSEQUENT PRENATAL CARE: CPT | Mod: S$GLB,,, | Performed by: OBSTETRICS & GYNECOLOGY

## 2017-11-10 PROCEDURE — 99999 PR PBB SHADOW E&M-EST. PATIENT-LVL I: CPT | Mod: PBBFAC,,, | Performed by: OBSTETRICS & GYNECOLOGY

## 2017-11-11 ENCOUNTER — PATIENT MESSAGE (OUTPATIENT)
Dept: OBSTETRICS AND GYNECOLOGY | Facility: CLINIC | Age: 32
End: 2017-11-11

## 2017-11-13 ENCOUNTER — HOSPITAL ENCOUNTER (INPATIENT)
Facility: OTHER | Age: 32
LOS: 2 days | Discharge: HOME | End: 2017-11-15
Attending: OBSTETRICS & GYNECOLOGY | Admitting: OBSTETRICS & GYNECOLOGY
Payer: COMMERCIAL

## 2017-11-13 ENCOUNTER — ANESTHESIA EVENT (OUTPATIENT)
Dept: OBSTETRICS AND GYNECOLOGY | Facility: OTHER | Age: 32
End: 2017-11-13
Payer: COMMERCIAL

## 2017-11-13 ENCOUNTER — ANESTHESIA (OUTPATIENT)
Dept: OBSTETRICS AND GYNECOLOGY | Facility: OTHER | Age: 32
End: 2017-11-13
Payer: COMMERCIAL

## 2017-11-13 DIAGNOSIS — Z37.9 NORMAL LABOR: ICD-10-CM

## 2017-11-13 DIAGNOSIS — Z3A.39 39 WEEKS GESTATION OF PREGNANCY: ICD-10-CM

## 2017-11-13 DIAGNOSIS — O98.819 GROUP B STREPTOCOCCAL INFECTION DURING PREGNANCY: ICD-10-CM

## 2017-11-13 DIAGNOSIS — B95.1 GROUP B STREPTOCOCCAL INFECTION DURING PREGNANCY: ICD-10-CM

## 2017-11-13 LAB
ABO + RH BLD: NORMAL
ALLENS TEST: ABNORMAL
BASOPHILS # BLD AUTO: 0.02 K/UL
BASOPHILS NFR BLD: 0.2 %
BLD GP AB SCN CELLS X3 SERPL QL: NORMAL
DIFFERENTIAL METHOD: ABNORMAL
EOSINOPHIL # BLD AUTO: 0.2 K/UL
EOSINOPHIL NFR BLD: 1.5 %
ERYTHROCYTE [DISTWIDTH] IN BLOOD BY AUTOMATED COUNT: 15.4 %
HCO3 UR-SCNC: 19.8 MMOL/L (ref 24–28)
HCT VFR BLD AUTO: 40.7 %
HGB BLD-MCNC: 13.4 G/DL
LYMPHOCYTES # BLD AUTO: 2.2 K/UL
LYMPHOCYTES NFR BLD: 18.9 %
MCH RBC QN AUTO: 26.1 PG
MCHC RBC AUTO-ENTMCNC: 32.9 G/DL
MCV RBC AUTO: 79 FL
MONOCYTES # BLD AUTO: 0.8 K/UL
MONOCYTES NFR BLD: 6.5 %
NEUTROPHILS # BLD AUTO: 8.5 K/UL
NEUTROPHILS NFR BLD: 72.6 %
PCO2 BLDA: 46.5 MMHG (ref 35–45)
PH SMN: 7.24 [PH] (ref 7.35–7.45)
PLATELET # BLD AUTO: 169 K/UL
PMV BLD AUTO: 11.4 FL
PO2 BLDA: 20 MMHG (ref 80–100)
POC BE: -8 MMOL/L
POC SATURATED O2: 23 % (ref 95–100)
RBC # BLD AUTO: 5.14 M/UL
SAMPLE: ABNORMAL
SITE: ABNORMAL
WBC # BLD AUTO: 11.67 K/UL

## 2017-11-13 PROCEDURE — 25000003 PHARM REV CODE 250: Performed by: STUDENT IN AN ORGANIZED HEALTH CARE EDUCATION/TRAINING PROGRAM

## 2017-11-13 PROCEDURE — 63600175 PHARM REV CODE 636 W HCPCS: Performed by: STUDENT IN AN ORGANIZED HEALTH CARE EDUCATION/TRAINING PROGRAM

## 2017-11-13 PROCEDURE — 57135 EXCISION VAGINAL CYST/TUMOR: CPT | Mod: 51,,, | Performed by: OBSTETRICS & GYNECOLOGY

## 2017-11-13 PROCEDURE — 59400 OBSTETRICAL CARE: CPT | Mod: QY,,, | Performed by: ANESTHESIOLOGY

## 2017-11-13 PROCEDURE — 72100003 HC LABOR CARE, EA. ADDL. 8 HRS

## 2017-11-13 PROCEDURE — 88304 TISSUE EXAM BY PATHOLOGIST: CPT | Performed by: PATHOLOGY

## 2017-11-13 PROCEDURE — 25000003 PHARM REV CODE 250: Performed by: OBSTETRICS & GYNECOLOGY

## 2017-11-13 PROCEDURE — 0KQM0ZZ REPAIR PERINEUM MUSCLE, OPEN APPROACH: ICD-10-PCS | Performed by: OBSTETRICS & GYNECOLOGY

## 2017-11-13 PROCEDURE — 25000003 PHARM REV CODE 250: Performed by: ANESTHESIOLOGY

## 2017-11-13 PROCEDURE — 88304 TISSUE EXAM BY PATHOLOGIST: CPT | Mod: 26,,, | Performed by: PATHOLOGY

## 2017-11-13 PROCEDURE — 62326 NJX INTERLAMINAR LMBR/SAC: CPT | Performed by: ANESTHESIOLOGY

## 2017-11-13 PROCEDURE — 86850 RBC ANTIBODY SCREEN: CPT

## 2017-11-13 PROCEDURE — 59400 OBSTETRICAL CARE: CPT | Mod: ,,, | Performed by: OBSTETRICS & GYNECOLOGY

## 2017-11-13 PROCEDURE — 59025 FETAL NON-STRESS TEST: CPT

## 2017-11-13 PROCEDURE — 0HB8XZZ EXCISION OF BUTTOCK SKIN, EXTERNAL APPROACH: ICD-10-PCS | Performed by: OBSTETRICS & GYNECOLOGY

## 2017-11-13 PROCEDURE — 51702 INSERT TEMP BLADDER CATH: CPT

## 2017-11-13 PROCEDURE — 11000001 HC ACUTE MED/SURG PRIVATE ROOM

## 2017-11-13 PROCEDURE — S0020 INJECTION, BUPIVICAINE HYDRO: HCPCS | Performed by: ANESTHESIOLOGY

## 2017-11-13 PROCEDURE — 27800517 HC TRAY,EPIDURAL-CONTINUOUS: Performed by: ANESTHESIOLOGY

## 2017-11-13 PROCEDURE — 86900 BLOOD TYPING SEROLOGIC ABO: CPT

## 2017-11-13 PROCEDURE — 63600175 PHARM REV CODE 636 W HCPCS: Performed by: OBSTETRICS & GYNECOLOGY

## 2017-11-13 PROCEDURE — 72100002 HC LABOR CARE, 1ST 8 HOURS

## 2017-11-13 PROCEDURE — 27200710 HC EPIDURAL INFUSION PUMP SET: Performed by: ANESTHESIOLOGY

## 2017-11-13 PROCEDURE — 99900035 HC TECH TIME PER 15 MIN (STAT)

## 2017-11-13 PROCEDURE — 82803 BLOOD GASES ANY COMBINATION: CPT

## 2017-11-13 PROCEDURE — 99285 EMERGENCY DEPT VISIT HI MDM: CPT | Mod: 25

## 2017-11-13 PROCEDURE — 85025 COMPLETE CBC W/AUTO DIFF WBC: CPT

## 2017-11-13 PROCEDURE — 72200005 HC VAGINAL DELIVERY LEVEL II

## 2017-11-13 PROCEDURE — 99283 EMERGENCY DEPT VISIT LOW MDM: CPT | Mod: ,,, | Performed by: OBSTETRICS & GYNECOLOGY

## 2017-11-13 RX ORDER — OXYTOCIN/RINGER'S LACTATE 20/1000 ML
41.65 PLASTIC BAG, INJECTION (ML) INTRAVENOUS CONTINUOUS
Status: ACTIVE | OUTPATIENT
Start: 2017-11-13 | End: 2017-11-13

## 2017-11-13 RX ORDER — ACETAMINOPHEN 325 MG/1
650 TABLET ORAL EVERY 6 HOURS PRN
Status: DISCONTINUED | OUTPATIENT
Start: 2017-11-13 | End: 2017-11-15 | Stop reason: HOSPADM

## 2017-11-13 RX ORDER — HYDROCODONE BITARTRATE AND ACETAMINOPHEN 10; 325 MG/1; MG/1
1 TABLET ORAL EVERY 4 HOURS PRN
Status: DISCONTINUED | OUTPATIENT
Start: 2017-11-13 | End: 2017-11-15 | Stop reason: HOSPADM

## 2017-11-13 RX ORDER — OXYTOCIN/RINGER'S LACTATE 20/1000 ML
20 PLASTIC BAG, INJECTION (ML) INTRAVENOUS ONCE
Status: DISCONTINUED | OUTPATIENT
Start: 2017-11-13 | End: 2017-11-15 | Stop reason: HOSPADM

## 2017-11-13 RX ORDER — ONDANSETRON 8 MG/1
8 TABLET, ORALLY DISINTEGRATING ORAL EVERY 8 HOURS PRN
Status: DISCONTINUED | OUTPATIENT
Start: 2017-11-13 | End: 2017-11-13

## 2017-11-13 RX ORDER — OXYTOCIN/RINGER'S LACTATE 20/1000 ML
PLASTIC BAG, INJECTION (ML) INTRAVENOUS
Status: DISPENSED
Start: 2017-11-13 | End: 2017-11-13

## 2017-11-13 RX ORDER — FENTANYL CITRATE 50 UG/ML
INJECTION, SOLUTION INTRAMUSCULAR; INTRAVENOUS
Status: COMPLETED
Start: 2017-11-13 | End: 2017-11-13

## 2017-11-13 RX ORDER — BUPIVACAINE HYDROCHLORIDE 2.5 MG/ML
INJECTION, SOLUTION EPIDURAL; INFILTRATION; INTRACAUDAL
Status: DISPENSED
Start: 2017-11-13 | End: 2017-11-13

## 2017-11-13 RX ORDER — OXYTOCIN/RINGER'S LACTATE 20/1000 ML
41.65 PLASTIC BAG, INJECTION (ML) INTRAVENOUS CONTINUOUS
Status: DISCONTINUED | OUTPATIENT
Start: 2017-11-13 | End: 2017-11-13

## 2017-11-13 RX ORDER — METHOCARBAMOL 500 MG/1
500 TABLET, FILM COATED ORAL 4 TIMES DAILY PRN
Status: DISCONTINUED | OUTPATIENT
Start: 2017-11-13 | End: 2017-11-14

## 2017-11-13 RX ORDER — DIPHENHYDRAMINE HCL 25 MG
25 CAPSULE ORAL EVERY 4 HOURS PRN
Status: DISCONTINUED | OUTPATIENT
Start: 2017-11-13 | End: 2017-11-15 | Stop reason: HOSPADM

## 2017-11-13 RX ORDER — DIPHENHYDRAMINE HYDROCHLORIDE 50 MG/ML
25 INJECTION INTRAMUSCULAR; INTRAVENOUS EVERY 4 HOURS PRN
Status: DISCONTINUED | OUTPATIENT
Start: 2017-11-13 | End: 2017-11-15 | Stop reason: HOSPADM

## 2017-11-13 RX ORDER — LIDOCAINE HYDROCHLORIDE AND EPINEPHRINE 15; 5 MG/ML; UG/ML
INJECTION, SOLUTION EPIDURAL
Status: DISCONTINUED | OUTPATIENT
Start: 2017-11-13 | End: 2017-11-13

## 2017-11-13 RX ORDER — FAMOTIDINE 10 MG/ML
20 INJECTION INTRAVENOUS ONCE
Status: DISCONTINUED | OUTPATIENT
Start: 2017-11-13 | End: 2017-11-13

## 2017-11-13 RX ORDER — CARBOPROST TROMETHAMINE 250 UG/ML
250 INJECTION, SOLUTION INTRAMUSCULAR
Status: DISCONTINUED | OUTPATIENT
Start: 2017-11-13 | End: 2017-11-13

## 2017-11-13 RX ORDER — ONDANSETRON 8 MG/1
8 TABLET, ORALLY DISINTEGRATING ORAL EVERY 8 HOURS PRN
Status: DISCONTINUED | OUTPATIENT
Start: 2017-11-13 | End: 2017-11-15 | Stop reason: HOSPADM

## 2017-11-13 RX ORDER — SODIUM CHLORIDE, SODIUM LACTATE, POTASSIUM CHLORIDE, CALCIUM CHLORIDE 600; 310; 30; 20 MG/100ML; MG/100ML; MG/100ML; MG/100ML
INJECTION, SOLUTION INTRAVENOUS CONTINUOUS
Status: DISCONTINUED | OUTPATIENT
Start: 2017-11-13 | End: 2017-11-13

## 2017-11-13 RX ORDER — DOCUSATE SODIUM 100 MG/1
200 CAPSULE, LIQUID FILLED ORAL 2 TIMES DAILY PRN
Status: DISCONTINUED | OUTPATIENT
Start: 2017-11-13 | End: 2017-11-15 | Stop reason: HOSPADM

## 2017-11-13 RX ORDER — FENTANYL/BUPIVACAINE/NS/PF 2MCG/ML-.1
PLASTIC BAG, INJECTION (ML) INJECTION CONTINUOUS
Status: DISCONTINUED | OUTPATIENT
Start: 2017-11-13 | End: 2017-11-13

## 2017-11-13 RX ORDER — METHYLERGONOVINE MALEATE 0.2 MG/ML
200 INJECTION INTRAVENOUS
Status: DISCONTINUED | OUTPATIENT
Start: 2017-11-13 | End: 2017-11-13

## 2017-11-13 RX ORDER — HYDROCODONE BITARTRATE AND ACETAMINOPHEN 5; 325 MG/1; MG/1
1 TABLET ORAL EVERY 4 HOURS PRN
Status: DISCONTINUED | OUTPATIENT
Start: 2017-11-13 | End: 2017-11-15 | Stop reason: HOSPADM

## 2017-11-13 RX ORDER — FENTANYL/BUPIVACAINE/NS/PF 2MCG/ML-.1
PLASTIC BAG, INJECTION (ML) INJECTION CONTINUOUS PRN
Status: DISCONTINUED | OUTPATIENT
Start: 2017-11-13 | End: 2017-11-13

## 2017-11-13 RX ORDER — BUPIVACAINE HYDROCHLORIDE 2.5 MG/ML
INJECTION, SOLUTION INFILTRATION; PERINEURAL
Status: DISCONTINUED | OUTPATIENT
Start: 2017-11-13 | End: 2017-11-13

## 2017-11-13 RX ORDER — CYCLOBENZAPRINE HCL 5 MG
5 TABLET ORAL 3 TIMES DAILY PRN
Status: DISCONTINUED | OUTPATIENT
Start: 2017-11-13 | End: 2017-11-13 | Stop reason: SDUPTHER

## 2017-11-13 RX ORDER — FENTANYL/BUPIVACAINE/NS/PF 2MCG/ML-.1
PLASTIC BAG, INJECTION (ML) INJECTION
Status: DISCONTINUED | OUTPATIENT
Start: 2017-11-13 | End: 2017-11-13

## 2017-11-13 RX ORDER — SODIUM CITRATE AND CITRIC ACID MONOHYDRATE 334; 500 MG/5ML; MG/5ML
30 SOLUTION ORAL ONCE
Status: DISCONTINUED | OUTPATIENT
Start: 2017-11-13 | End: 2017-11-13

## 2017-11-13 RX ORDER — MISOPROSTOL 200 UG/1
800 TABLET ORAL
Status: DISCONTINUED | OUTPATIENT
Start: 2017-11-13 | End: 2017-11-13

## 2017-11-13 RX ORDER — FENTANYL CITRATE 50 UG/ML
INJECTION, SOLUTION INTRAMUSCULAR; INTRAVENOUS
Status: DISCONTINUED | OUTPATIENT
Start: 2017-11-13 | End: 2017-11-13

## 2017-11-13 RX ORDER — FENTANYL/BUPIVACAINE/NS/PF 2MCG/ML-.1
PLASTIC BAG, INJECTION (ML) INJECTION
Status: COMPLETED
Start: 2017-11-13 | End: 2017-11-13

## 2017-11-13 RX ADMIN — DEXTROSE 5 MILLION UNITS: 50 INJECTION, SOLUTION INTRAVENOUS at 02:11

## 2017-11-13 RX ADMIN — Medication 333 MILLI-UNITS/MIN: at 12:11

## 2017-11-13 RX ADMIN — Medication 5 ML: at 02:11

## 2017-11-13 RX ADMIN — PENICILLIN G POTASSIUM 2.5 MILLION UNITS: 20000000 INJECTION, POWDER, FOR SOLUTION INTRAVENOUS at 06:11

## 2017-11-13 RX ADMIN — LIDOCAINE HYDROCHLORIDE,EPINEPHRINE BITARTRATE 3 ML: 15; .005 INJECTION, SOLUTION EPIDURAL; INFILTRATION; INTRACAUDAL; PERINEURAL at 02:11

## 2017-11-13 RX ADMIN — BUPIVACAINE HYDROCHLORIDE 5 ML: 2.5 INJECTION, SOLUTION INFILTRATION; PERINEURAL at 09:11

## 2017-11-13 RX ADMIN — HYDROCODONE BITARTRATE AND ACETAMINOPHEN 1 TABLET: 5; 325 TABLET ORAL at 04:11

## 2017-11-13 RX ADMIN — Medication 41.65 MILLI-UNITS/MIN: at 12:11

## 2017-11-13 RX ADMIN — SODIUM CHLORIDE, SODIUM LACTATE, POTASSIUM CHLORIDE, AND CALCIUM CHLORIDE 1000 ML: 600; 310; 30; 20 INJECTION, SOLUTION INTRAVENOUS at 02:11

## 2017-11-13 RX ADMIN — HYDROCODONE BITARTRATE AND ACETAMINOPHEN 1 TABLET: 10; 325 TABLET ORAL at 11:11

## 2017-11-13 RX ADMIN — PENICILLIN G POTASSIUM 2.5 MILLION UNITS: 20000000 INJECTION, POWDER, FOR SOLUTION INTRAVENOUS at 10:11

## 2017-11-13 RX ADMIN — SODIUM CHLORIDE, SODIUM LACTATE, POTASSIUM CHLORIDE, AND CALCIUM CHLORIDE: 600; 310; 30; 20 INJECTION, SOLUTION INTRAVENOUS at 02:11

## 2017-11-13 RX ADMIN — METHOCARBAMOL 500 MG: 500 TABLET ORAL at 10:11

## 2017-11-13 RX ADMIN — Medication 10 ML/HR: at 03:11

## 2017-11-13 RX ADMIN — FENTANYL CITRATE 100 MCG: 50 INJECTION, SOLUTION INTRAMUSCULAR; INTRAVENOUS at 07:11

## 2017-11-13 NOTE — ANESTHESIA PROCEDURE NOTES
Epidural    Patient location during procedure: OB   Reason for block: primary anesthetic   Diagnosis: Active Labor   Start time: 11/13/2017 2:46 AM  Timeout: 11/13/2017 2:45 AM  End time: 11/13/2017 3:00 AM  Surgery related to: Vaginal Delivery  Staffing  Anesthesiologist: STEPHEN DANIELS  Resident/CRNA: TONE CARPENTER  Performed: resident/CRNA   Preanesthetic Checklist  Completed: patient identified, site marked, surgical consent, pre-op evaluation, timeout performed, IV checked, risks and benefits discussed, monitors and equipment checked, anesthesia consent given, hand hygiene performed and patient being monitored  Preparation  Patient position: sitting  Prep: ChloraPrep  Patient monitoring: Pulse Ox and Blood Pressure  Epidural  Skin Anesthetic: lidocaine 1%  Skin Wheal: 3 mL  Administration type: continuous  Approach: midline  Interspace: L4-5  Injection technique: BLAYNE saline  Needle and Epidural Catheter  Needle type: Tuohy   Needle gauge: 17  Needle length: 3.5 inches  Needle insertion depth: 7 cm  Catheter type: springwound and multi-orifice  Catheter size: 19 G  Catheter at skin depth: 11 cm  Test dose: 3 mL of lidocaine 1.5% with Epi 1-to-200,000  Additional Documentation: incremental injection, negative aspiration for heme and CSF, no paresthesia on injection, no signs/symptoms of IV or SA injection, no significant pain on injection and no significant complaints from patient  Needle localization: anatomical landmarks  Medications:  Bolus administered: 10 mL of 0.1% bupivacaine  Opioid administered: 20 mcg of   fentanyl  Volume per aspiration: 5 mL  Time between aspirations: 5 minutes  Assessment  Upper dermatomal levels - Left: T5  Right: T5   Dermatomal levels determined by pinch or prick  Ease of block: easy  Patient's tolerance of the procedure: comfortable throughout block and no complaints

## 2017-11-13 NOTE — PROGRESS NOTES
"LABOR NOTE    S:  Complaints: Yes- neck pain that started approximately 15 min ago.  Reports that pain feels like a strain/pulled muscle.  Epidural working:  yes      O: BP (!) 122/59   Pulse 77   Temp 99 °F (37.2 °C) (Oral)   Ht 5' 2" (1.575 m)   Wt 96.1 kg (211 lb 13.8 oz)   LMP 2017 (Exact Date)   SpO2 99%   Breastfeeding? No   BMI 38.75 kg/m²       FHT: reactive Cat 1 (reassuring) 160bpm, + accels, no decels  CTX: q 2 minutes  SVE: AL/100/-1      ASSESSMENT:   32 y.o.  at 39w4d, labor    FHT reassuring    Active Hospital Problems    Diagnosis  POA    *Normal labor [O80, Z37.9]  Not Applicable      Resolved Hospital Problems    Diagnosis Date Resolved POA   No resolved problems to display.         PLAN:    Continue Close Maternal/Fetal Monitoring  Not on any medication for augmentation  Recheck 2 hours or PRN    Shannon Liao MD  PGY-4 OB/GYN  [phone]      "

## 2017-11-13 NOTE — ED PROVIDER NOTES
Encounter Date: 2017       History     Chief Complaint   Patient presents with    Vaginal Bleeding    Contractions    Rupture of Membranes     Yvette Reyes is a 32 y.o. [id number] at 39w4d presents complaining of contractions and leakage of fluid. She reports the contractions started at 6 minutes apart and are increasing in frequency. She noticed an initial dampness in her underwear at 0030 today and has felt continued leakage since then. She also complains of bleeding consistent with blood show. This IUP is complicated by GBS+ status.            Review of patient's allergies indicates:   Allergen Reactions    Motrin [ibuprofen]      No past medical history on file.  No past surgical history on file.  Family History   Problem Relation Age of Onset    Breast cancer Neg Hx     Colon cancer Neg Hx     Ovarian cancer Neg Hx      Social History   Substance Use Topics    Smoking status: Never Smoker    Smokeless tobacco: Never Used    Alcohol use No     Review of Systems   Constitutional: Negative for chills, fatigue and fever.   HENT: Negative for congestion.    Eyes: Negative for visual disturbance.   Respiratory: Negative for cough and shortness of breath.    Cardiovascular: Negative for chest pain and palpitations.   Gastrointestinal: Negative for abdominal distention, abdominal pain, constipation, diarrhea, nausea and vomiting.   Genitourinary: Positive for pelvic pain and vaginal discharge. Negative for difficulty urinating, dysuria, hematuria and vaginal bleeding.   Skin: Negative for rash.   Neurological: Negative for dizziness, seizures, light-headedness and headaches.   Hematological: Does not bruise/bleed easily.   Psychiatric/Behavioral: Negative for dysphoric mood. The patient is not nervous/anxious.        Physical Exam     Initial Vitals [17 0130]   BP Pulse Resp Temp SpO2   138/83 (!) 112 -- 97.5 °F (36.4 °C) 100 %      MAP       101.33         Physical Exam    Vitals  reviewed.  Constitutional: She appears well-developed and well-nourished. She is not diaphoretic. No distress.   HENT:   Head: Normocephalic and atraumatic.   Eyes: EOM are normal. Pupils are equal, round, and reactive to light.   Neck: Normal range of motion. Neck supple.   Cardiovascular: Normal rate and regular rhythm.   Pulmonary/Chest: No respiratory distress.   Abdominal: Soft. She exhibits no distension. There is no tenderness. There is no rebound.   Musculoskeletal: Normal range of motion. She exhibits no edema or tenderness.   Neurological: She is alert and oriented to person, place, and time. She has normal strength and normal reflexes.   Skin: Skin is warm and dry.     OB LABOR EXAM:     Membranes ruptured: Yes.   Method: Sterile speculum exam per MD.   Vaginal Bleeding: bloody show.     Dilatation: 4.   Station: -2.   Effacement: 80%.   Amniotic Fluid Color: normal.   Amniotic Fluid Amount: moderate.         ED Course   Obtain Fetal nonstress test (NST)  Date/Time: 2017 1:59 AM  Performed by: SHANELL CARDOZA  Authorized by: SHANELL CARDOZA     Nonstress Test:     Variability:  6-25 BPM    Decelerations:  None    Accelerations:  15 bpm    Baseline:  150    Contractions:  Regular    Contraction Frequency:  4-5 min  Biophysical Profile:     Nonstress Test Interpretation: reactive      Overall Impression:  Reassuring      Labs Reviewed   CBC W/ AUTO DIFFERENTIAL   TYPE & SCREEN             Medical Decision Making:   Initial Assessment:   32 y.o. [id number] at 39w4d presents complaining of contractions and leakage of fluid.  Differential Diagnosis:   Labor  Ruptured membranes  ED Management:  - Patient in no acute distress but having painful contractions.  - NST reactive and reassuring. Regular contractions on TOCO.  - SVE 4-5/80/-2.   - Gross rupture of membranes on exam. Nitrazine positive  - Admit to L&D for labor                     ED Course      Clinical Impression:   The primary  encounter diagnosis was Rupture of membranes with clear amniotic fluid. Diagnoses of Normal labor, 39 weeks gestation of pregnancy, and Group B streptococcal infection during pregnancy were also pertinent to this visit.                           Glen Dunbar MD  Resident  11/13/17 0201

## 2017-11-13 NOTE — ANESTHESIA PREPROCEDURE EVALUATION
2017  Yvette Reyes is a 32 y.o., female  [id number] at 39w4d presents complaining of contractions and leakage of fluid.    who presents for:    Pre-operative evaluation for * No surgery found *    OB History    Para Term  AB Living   1 0 0 0 0 0   SAB TAB Ectopic Multiple Live Births   0 0 0 0        # Outcome Date GA Lbr Rom/2nd Weight Sex Delivery Anes PTL Lv   1 Current                   Wt Readings from Last 1 Encounters:   11/10/17 1517 96 kg (211 lb 10.3 oz)       BP Readings from Last 3 Encounters:   17 138/83   11/10/17 120/72   17 126/74       Patient Active Problem List   Diagnosis    Pregnant    Short cervix --- VAGINAL PROGESTERONE    GBBS     Normal labor       No past surgical history on file.    Social History     Social History    Marital status:      Spouse name: N/A    Number of children: N/A    Years of education: N/A     Occupational History    Not on file.     Social History Main Topics    Smoking status: Never Smoker    Smokeless tobacco: Never Used    Alcohol use No    Drug use: No    Sexual activity: Yes     Partners: Male     Birth control/ protection: None     Other Topics Concern    Not on file     Social History Narrative    No narrative on file         Chemistry    No results found for: NA, K, CL, CO2, BUN, CREATININE, GLU No results found for: CALCIUM, ALKPHOS, AST, ALT, BILITOT, ESTGFRAFRICA, EGFRNONAA         Lab Results   Component Value Date    WBC 11.68 2017    HGB 12.0 2017    HCT 37.1 2017    MCV 83 2017     2017       No results for input(s): INR, PROTIME, APTT in the last 72 hours.    Invalid input(s): PT      Anesthesia Evaluation    I have reviewed the Patient Summary Reports.     I have reviewed the Medications.     Review of Systems  Anesthesia Hx:  No problems with previous  Anesthesia Denies Hx of Anesthetic complications  Neg history of prior surgery.  Denies Personal Hx of Anesthesia complications.   Social:  Non-Smoker, No Alcohol Use    Hematology/Oncology:  Hematology Normal   Oncology Normal     EENT/Dental:EENT/Dental Normal   Cardiovascular:  Cardiovascular Normal Exercise tolerance: good     Pulmonary:  Pulmonary Normal    Renal/:  Renal/ Normal     Hepatic/GI:  Hepatic/GI Normal    Musculoskeletal:  Musculoskeletal Normal    Neurological:  Neurology Normal    Endocrine:  Endocrine Normal    Psych:  Psychiatric Normal           Physical Exam  General:  Well nourished    Airway/Jaw/Neck:  Airway Findings: Mouth Opening: Normal Tongue: Normal  General Airway Assessment: Adult  Oropharynx Findings: Normal Mallampati: II  TM Distance: Normal, at least 6 cm  Jaw/Neck Findings:  Neck ROM: Normal ROM  Neck Findings: Normal    Eyes/Ears/Nose:  EYES/EARS/NOSE FINDINGS: Normal   Dental:  Dental Findings: In tact   Chest/Lungs:  Chest/Lungs Findings: Normal Respiratory Rate     Heart/Vascular:  Heart Findings: Rate: Normal        Mental Status:  Mental Status Findings:  Cooperative, Alert and Oriented         Anesthesia Plan  Type of Anesthesia, risks & benefits discussed:  Anesthesia Type:  general, epidural, CSE, spinal  Patient's Preference:   Intra-op Monitoring Plan: standard ASA monitors  Intra-op Monitoring Plan Comments:   Post Op Pain Control Plan: multimodal analgesia and per primary service following discharge from PACU  Post Op Pain Control Plan Comments:   Induction:   IV  Beta Blocker:  Patient is not currently on a Beta-Blocker (No further documentation required).       Informed Consent: Patient understands risks and agrees with Anesthesia plan.  Questions answered. Anesthesia consent signed with patient.  ASA Score: 2     Day of Surgery Review of History & Physical:    H&P update referred to the provider.     Anesthesia Plan Notes: Discussed anesthetic risks  associated with labor epidural including infection, bleeding, dural puncture, and neurologic complications.        Ready For Surgery From Anesthesia Perspective.

## 2017-11-13 NOTE — SUBJECTIVE & OBJECTIVE
Obstetric History       T0      L0     SAB0   TAB0   Ectopic0   Multiple0   Live Births0       # Outcome Date GA Lbr Rom/2nd Weight Sex Delivery Anes PTL Lv   1 Current                 No past medical history on file.  No past surgical history on file.    PTA Medications   Medication Sig    PNV 11-iron fum-folic acid-om3 (VIVA DHA) 28-1-200 mg Cap Take 1 capsule by mouth once daily.    progesterone (PROMETRIUM) 200 MG capsule Place 1 capsule (200 mg total) vaginally nightly.       Review of patient's allergies indicates:   Allergen Reactions    Motrin [ibuprofen]         Family History     None        Social History Main Topics    Smoking status: Never Smoker    Smokeless tobacco: Never Used    Alcohol use No    Drug use: No    Sexual activity: Yes     Partners: Male     Birth control/ protection: None     Review of Systems   Constitutional: Negative.    Eyes: Negative.    Respiratory: Negative for shortness of breath.    Cardiovascular: Negative for chest pain.   Gastrointestinal: Negative for abdominal pain, bloating and constipation.   Endocrine: Negative.    Genitourinary: Positive for pelvic pain and vaginal discharge. Negative for dyspareunia, frequency, menstrual problem and vaginal bleeding.   Musculoskeletal: Negative for back pain.   Skin:  Negative.   Neurological: Negative for headaches.   Hematological: Negative.    Psychiatric/Behavioral: Negative.    Breast: Negative for breast mass and breast pain     Objective:     Vital Signs (Most Recent):  Temp: 97.5 °F (36.4 °C) (17)  Pulse: (!) 112 (17)  BP: 138/83 (17)  SpO2: 100 % (17) Vital Signs (24h Range):  Temp:  [97.5 °F (36.4 °C)] 97.5 °F (36.4 °C)  Pulse:  [112] 112  SpO2:  [100 %] 100 %  BP: (138)/(83) 138/83        There is no height or weight on file to calculate BMI.    FHT: Cat 1 (reassuring)  TOCO:  Q 4 minutes    Physical Exam:   Constitutional: She is oriented to person,  place, and time. She appears well-developed and well-nourished. No distress.    HENT:   Head: Atraumatic.    Eyes: EOM are normal. Pupils are equal, round, and reactive to light.    Neck: Normal range of motion. Neck supple.    Cardiovascular: Normal rate and regular rhythm.     Pulmonary/Chest: Effort normal. No respiratory distress. She has no wheezes.        Abdominal: Soft. She exhibits no distension and no abdominal incision. There is no tenderness.     Genitourinary:   Genitourinary Comments: Gross rupture of membranes           Musculoskeletal: Normal range of motion. She exhibits no edema or tenderness.       Neurological: She is alert and oriented to person, place, and time. She has normal reflexes.    Skin: Skin is warm and dry. She is not diaphoretic.        Cervix:  Dilation:  5  Effacement:  80%  Station: -2  Presentation: Vertex     Significant Labs:  Lab Results   Component Value Date    GROUPTRH B POS 03/21/2017    HEPBSAG Negative 03/21/2017    STREPBCULT  10/13/2017     STREPTOCOCCUS AGALACTIAE (GROUP B)  Beta-hemolytic streptococci are routinely susceptible to   penicillins,cephalosporins and carbapenems.         I have personallly reviewed all pertinent lab results from the last 24 hours.

## 2017-11-13 NOTE — PROGRESS NOTES
Attempted to see patient for neck pain. Per nurse report, pt having severe neck pain that is restricting movement. Believes this may be due to pushing in labor. Received vicodin a few hours ago, not yet due for additional dose, still reporting significant pain.     Unable to see patient, lactation consultation in progress.    Temp:  [97.5 °F (36.4 °C)-99 °F (37.2 °C)] 98.5 °F (36.9 °C)  Pulse:  [] 86  Resp:  [18] 18  SpO2:  [98 %-100 %] 100 %  BP: (115-160)/() 135/75    Flexeril TID PRN  Hot packs PRN  Continue routine postpartum care  Will have resident come re-evaluate after lactation    Evin Barajas MD  PGY1, OBGYN [facility]

## 2017-11-13 NOTE — PROGRESS NOTES
Pt lying skin to skin c infant.  States she has been having neck pain, heating pad and current position is helping.  Denies needs @ present.

## 2017-11-13 NOTE — L&D DELIVERY NOTE
cephalic after approximately 120 minutes of maternal pushing.  Under epidural anesthesia.  Infant delivered PETER over second degree tear  perineum.  Infant also tolerated the delivery well and was placed on mothers abdomen for skin to skin and bulb suctioning performed.  Cord clamped and cut.  Umbilical arterial gas and venous blood obtained.  Periurethral and posterior second degree vaginal laceration repaired with 2-0 vicryl.   Inclusion cyst removed on right buttock. Repaired with 2-0 vicryl mattress style suture.   Placenta delivered spontaneously and IV pitocin given.  Uterine tone noted. No cervical lacerations.  Patient tolerated delivery well.   cc  Staff present for entire procedure.  S/L/N counts correct x2.      Delivery Information for  Maurisio Reyes    Birth information:  YOB: 2015   Time of birth: 12:01 PM   Sex: male   Head Delivery Date/Time: 2017 12:01 PM   Delivery type: Vaginal, Spontaneous Delivery   Gestational Age: 39w4d    Delivery Providers    Delivering clinician:  Alan Sawyer Jr., MD   Other personnel:   Provider Role   Rei Kat MD Resident   Anna Louis RN Registered Nurse   Abiola Garcia RN Registered Nurse   Mecca Jeffrey RN Registered Nurse               [place] Measurements    Weight:  2960 g Length:  52.1 cm   Head circum.:  33 cm Chest circum.:  32.4 cm          [place] Assessment    Living status:  Living  Apgars:     1 Minute:   5 Minute:   10 Minute:   15 Minute:   20 Minute:     Skin Color:          Heart Rate:          Reflex Irritability:          Muscle Tone:          Respiratory Effort:          Total:                  Apgars Assigned By:  SHAWNA JEFFREY RN         Assisted Delivery Details:    Forceps attempted?:  No  Vacuum extractor attempted?:  No         Shoulder Dystocia    Shoulder dystocia present?:  No           Presentation and Position    Presentation:   Vertex   Position:       Occiput    Anterior             Interventions/Resuscitation         Cord    Vessels:  3 vessels  Complications:  None  Cord Clamped Date/Time:  2017 12:02 PM  Cord Blood Disposition:  Sent with Baby  Gases Sent?:  Yes  Stem Cell Collection (by MD):  No       Placenta    Date and time:  2017 12:05 PM  Removal:  Spontaneous  Appearance:  Intact  Placenta disposition:  discarded           Labor Events:       labor: No     Labor Onset Date/Time:         Dilation Complete Date/Time:         Start Pushing Date/Time: 2017 09:45     Rupture Date/Time:              Rupture type:           Fluid Amount:        Fluid Color:        Fluid Odor:        Membrane Status (PeriCalm): SRM (Spontaneous Rupture)      Rupture Date/Time (PeriCalm): 2017 00:30:00      Fluid Amount (PeriCalm): Small      Fluid Color (PeriCalm): Clear       steroids: None     Antibiotics given for GBS: Yes     Induction: none     Indications for induction:        Augmentation:       Indications for augmentation:       Labor complications: None     Additional complications:          Cervical ripening:                     Delivery:      Episiotomy: None     Indication for Episiotomy:       Perineal Lacerations: 2nd Repaired:  Yes   Periurethral Laceration: none Repaired:     Labial Laceration: none Repaired:     Sulcus Laceration: none Repaired:     Vaginal Laceration: No Repaired:     Cervical Laceration: No Repaired:     Repair suture:       Repair # of packets: 3     Vaginal delivery QBL (mL): 358      QBL (mL): 0     Combined Blood Loss (mL): 358     Vaginal Sweep Performed: Yes     Surgicount Correct:         Other providers:       Anesthesia    Method:  Epidural          Details (if applicable):  Trial of Labor      Categorization:      Priority:     Indications for :     Incision Type:       Additional  information:  Forceps:    Vacuum:    Breech:    Observed anomalies    Other (Comments):          SEEN AND EXAMINED BY ME  AGREE WITH ABOVE    Alan Sawyer MD

## 2017-11-13 NOTE — ASSESSMENT & PLAN NOTE
- Consents signed and to chart  - Admit to Labor and Delivery unit  - Plan for expecant management    - PCN for GBS status  - Draw CBC, T&S  - Notify Staff  - Ultrasound performed, fetus in vertex position.  - Recheck in 2 hrs or PRN  - Post-Partum Hemorrhage risk - low

## 2017-11-13 NOTE — HPI
Yvette Reyes is a 32 y.o. [id number] at 39w4d presents complaining of contractions and leakage of fluid. She reports the contractions started at 6 minutes apart and are increasing in frequency. She noticed an initial dampness in her underwear at 0030 today and has felt continued leakage since then. She also complains of bleeding consistent with blood show. This IUP is complicated by GBS+ status.      Membranes found to be ruptured on exam in the OB ED. Patient having painful contractions with cervix dilated to 5 cm.

## 2017-11-13 NOTE — H&P
[facility]  Obstetrics  History & Physical    Patient Name: Yvette Reyes  MRN: [id number]  Admission Date: 2017  Primary Care Provider: Primary Doctor No    Subjective:     Principal Problem:Normal labor    History of Present Illness:  Yvette Reyes is a 32 y.o. [id number] at 39w4d presents complaining of contractions and leakage of fluid. She reports the contractions started at 6 minutes apart and are increasing in frequency. She noticed an initial dampness in her underwear at 0030 today and has felt continued leakage since then. She also complains of bleeding consistent with blood show. This IUP is complicated by GBS+ status.      Membranes found to be ruptured on exam in the OB ED. Patient having painful contractions with cervix dilated to 5 cm.        Obstetric History       T0      L0     SAB0   TAB0   Ectopic0   Multiple0   Live Births0       # Outcome Date GA Lbr Rom/2nd Weight Sex Delivery Anes PTL Lv   1 Current                 No past medical history on file.  No past surgical history on file.    PTA Medications   Medication Sig    PNV 11-iron fum-folic acid-om3 (VIVA DHA) 28-1-200 mg Cap Take 1 capsule by mouth once daily.    progesterone (PROMETRIUM) 200 MG capsule Place 1 capsule (200 mg total) vaginally nightly.       Review of patient's allergies indicates:   Allergen Reactions    Motrin [ibuprofen]         Family History     None        Social History Main Topics    Smoking status: Never Smoker    Smokeless tobacco: Never Used    Alcohol use No    Drug use: No    Sexual activity: Yes     Partners: Male     Birth control/ protection: None     Review of Systems   Constitutional: Negative.    Eyes: Negative.    Respiratory: Negative for shortness of breath.    Cardiovascular: Negative for chest pain.   Gastrointestinal: Negative for abdominal pain, bloating and constipation.   Endocrine: Negative.    Genitourinary: Positive for pelvic pain and vaginal  discharge. Negative for dyspareunia, frequency, menstrual problem and vaginal bleeding.   Musculoskeletal: Negative for back pain.   Skin:  Negative.   Neurological: Negative for headaches.   Hematological: Negative.    Psychiatric/Behavioral: Negative.    Breast: Negative for breast mass and breast pain     Objective:     Vital Signs (Most Recent):  Temp: 97.5 °F (36.4 °C) (11/13/17 0130)  Pulse: (!) 112 (11/13/17 0130)  BP: 138/83 (11/13/17 0130)  SpO2: 100 % (11/13/17 0130) Vital Signs (24h Range):  Temp:  [97.5 °F (36.4 °C)] 97.5 °F (36.4 °C)  Pulse:  [112] 112  SpO2:  [100 %] 100 %  BP: (138)/(83) 138/83        There is no height or weight on file to calculate BMI.    FHT: Cat 1 (reassuring)  TOCO:  Q 4 minutes    Physical Exam:   Constitutional: She is oriented to person, place, and time. She appears well-developed and well-nourished. No distress.    HENT:   Head: Atraumatic.    Eyes: EOM are normal. Pupils are equal, round, and reactive to light.    Neck: Normal range of motion. Neck supple.    Cardiovascular: Normal rate and regular rhythm.     Pulmonary/Chest: Effort normal. No respiratory distress. She has no wheezes.        Abdominal: Soft. She exhibits no distension and no abdominal incision. There is no tenderness.     Genitourinary:   Genitourinary Comments: Gross rupture of membranes           Musculoskeletal: Normal range of motion. She exhibits no edema or tenderness.       Neurological: She is alert and oriented to person, place, and time. She has normal reflexes.    Skin: Skin is warm and dry. She is not diaphoretic.        Cervix:  Dilation:  5  Effacement:  80%  Station: -2  Presentation: Vertex     Significant Labs:  Lab Results   Component Value Date    GROUPTRH B POS 03/21/2017    HEPBSAG Negative 03/21/2017    STREPBCULT  10/13/2017     STREPTOCOCCUS AGALACTIAE (GROUP B)  Beta-hemolytic streptococci are routinely susceptible to   penicillins,cephalosporins and carbapenems.         I have  personallly reviewed all pertinent lab results from the last 24 hours.    Assessment/Plan:     32 y.o. female  at 39w4d for:    * Normal labor    - Consents signed and to chart  - Admit to Labor and Delivery unit  - Plan for expecant management    - PCN for GBS status  - Draw CBC, T&S  - Notify Staff  - Ultrasound performed, fetus in vertex position.  - Recheck in 2 hrs or PRN  - Post-Partum Hemorrhage risk - low            Glen Dunbar MD  Obstetrics  [facility]

## 2017-11-13 NOTE — PROGRESS NOTES
Pt  C/o neck pain, heating pack applied to neck and pt repositioned and pillow adjusted for comfort.  Pt states she will try skin to skin and let rn know if any changes or if she wants pain meds.  poc discussed, pt verablized understanding. Safety precautions maintained.

## 2017-11-14 PROBLEM — M54.2 NECK PAIN: Status: ACTIVE | Noted: 2017-11-14

## 2017-11-14 LAB
BASOPHILS # BLD AUTO: 0.02 K/UL
BASOPHILS NFR BLD: 0.2 %
DIFFERENTIAL METHOD: ABNORMAL
EOSINOPHIL # BLD AUTO: 0.1 K/UL
EOSINOPHIL NFR BLD: 1.2 %
ERYTHROCYTE [DISTWIDTH] IN BLOOD BY AUTOMATED COUNT: 15.9 %
HCT VFR BLD AUTO: 32 %
HGB BLD-MCNC: 10.4 G/DL
LYMPHOCYTES # BLD AUTO: 2.4 K/UL
LYMPHOCYTES NFR BLD: 21.7 %
MCH RBC QN AUTO: 25.9 PG
MCHC RBC AUTO-ENTMCNC: 32.5 G/DL
MCV RBC AUTO: 80 FL
MONOCYTES # BLD AUTO: 0.8 K/UL
MONOCYTES NFR BLD: 7.3 %
NEUTROPHILS # BLD AUTO: 7.6 K/UL
NEUTROPHILS NFR BLD: 69.3 %
PLATELET # BLD AUTO: 150 K/UL
PMV BLD AUTO: 11.2 FL
RBC # BLD AUTO: 4.01 M/UL
WBC # BLD AUTO: 10.95 K/UL

## 2017-11-14 PROCEDURE — 85025 COMPLETE CBC W/AUTO DIFF WBC: CPT

## 2017-11-14 PROCEDURE — 25000003 PHARM REV CODE 250: Performed by: ANESTHESIOLOGY

## 2017-11-14 PROCEDURE — 25000003 PHARM REV CODE 250: Performed by: OBSTETRICS & GYNECOLOGY

## 2017-11-14 PROCEDURE — 25000003 PHARM REV CODE 250: Performed by: STUDENT IN AN ORGANIZED HEALTH CARE EDUCATION/TRAINING PROGRAM

## 2017-11-14 PROCEDURE — 36415 COLL VENOUS BLD VENIPUNCTURE: CPT

## 2017-11-14 PROCEDURE — 11000001 HC ACUTE MED/SURG PRIVATE ROOM

## 2017-11-14 RX ORDER — NAPROXEN SODIUM 275 MG/1
550 TABLET ORAL 2 TIMES DAILY WITH MEALS
Status: DISCONTINUED | OUTPATIENT
Start: 2017-11-14 | End: 2017-11-15 | Stop reason: HOSPADM

## 2017-11-14 RX ORDER — METHOCARBAMOL 500 MG/1
500 TABLET, FILM COATED ORAL 4 TIMES DAILY PRN
Status: DISCONTINUED | OUTPATIENT
Start: 2017-11-14 | End: 2017-11-15 | Stop reason: HOSPADM

## 2017-11-14 RX ADMIN — HYDROCODONE BITARTRATE AND ACETAMINOPHEN 1 TABLET: 10; 325 TABLET ORAL at 09:11

## 2017-11-14 RX ADMIN — METHOCARBAMOL 500 MG: 500 TABLET ORAL at 07:11

## 2017-11-14 RX ADMIN — HYDROCODONE BITARTRATE AND ACETAMINOPHEN 1 TABLET: 10; 325 TABLET ORAL at 08:11

## 2017-11-14 RX ADMIN — METHOCARBAMOL 500 MG: 500 TABLET ORAL at 10:11

## 2017-11-14 RX ADMIN — HYDROCODONE BITARTRATE AND ACETAMINOPHEN 1 TABLET: 10; 325 TABLET ORAL at 04:11

## 2017-11-14 RX ADMIN — NAPROXEN SODIUM 550 MG: 275 TABLET ORAL at 04:11

## 2017-11-14 RX ADMIN — HYDROCODONE BITARTRATE AND ACETAMINOPHEN 1 TABLET: 10; 325 TABLET ORAL at 12:11

## 2017-11-14 RX ADMIN — METHOCARBAMOL 500 MG: 500 TABLET ORAL at 04:11

## 2017-11-14 NOTE — PLAN OF CARE
Problem: Patient Care Overview  Goal: Plan of Care Review  Outcome: Ongoing (interventions implemented as appropriate)  Pt tolerating PO well, no acute distress, ambulating and voiding without difficulty, bleeding moderate, fundus firm, pain well controlled on prescribed meds, bonding well with infant-- breast feeding.  Pt safety maintained.

## 2017-11-14 NOTE — PLAN OF CARE
Problem: Patient Care Overview  Goal: Plan of Care Review  Outcome: Ongoing (interventions implemented as appropriate)  To breastfeed baby 8x or more in 24 hours, feed on cue. To practice correct latch and positioning with breast compression during feeding and skin to skin during feeding.  To observe for signs of milk transfer during feeding.  To call for further breastfeeding assistance/ support if needed.  If unable to latch, try hand expression of colostrum and feed baby via spoon or cup.

## 2017-11-14 NOTE — SUBJECTIVE & OBJECTIVE
"Hospital course: No notes on file    Interval History:     She is doing well this morning. She is tolerating a regular diet without nausea or vomiting. She is voiding spontaneously. She is ambulating. She has passed flatus, and has not had a BM. Vaginal bleeding is mild. She denies fever or chills. Abdominal pain is moderate and controlled with oral medications. She is breastfeeding. She desires circumcision for her male baby: no.    Patient with neck pain during and after delivery. Patient describes as "sprain." Denies neurological symptoms. Resolved with massage but persists with sudden movements. Desires "something else" for the pain.     Objective:     Vital Signs (Most Recent):  Temp: 99.4 °F (37.4 °C) (11/14/17 0014)  Pulse: 92 (11/14/17 0014)  Resp: 12 (11/14/17 0014)  BP: (!) 98/58 (11/14/17 0014)  SpO2: 99 % (11/14/17 0014) Vital Signs (24h Range):  Temp:  [98.5 °F (36.9 °C)-99.4 °F (37.4 °C)] 99.4 °F (37.4 °C)  Pulse:  [] 92  Resp:  [12-20] 12  SpO2:  [98 %-100 %] 99 %  BP: ()/(58-79) 98/58     Weight: 96.1 kg (211 lb 13.8 oz)  Body mass index is 38.75 kg/m².      Intake/Output Summary (Last 24 hours) at 11/14/17 0643  Last data filed at 11/13/17 2000   Gross per 24 hour   Intake                0 ml   Output             1858 ml   Net            -1858 ml       Significant Labs:  Lab Results   Component Value Date    GROUPTRH B POS 11/13/2017    HEPBSAG Negative 03/21/2017    STREPBCULT  10/13/2017     STREPTOCOCCUS AGALACTIAE (GROUP B)  Beta-hemolytic streptococci are routinely susceptible to   penicillins,cephalosporins and carbapenems.         Recent Labs  Lab 11/14/17  0519   HGB 10.4*   HCT 32.0*       I have personallly reviewed all pertinent lab results from the last 24 hours.    Physical Exam:   Constitutional: She appears well-developed and well-nourished. No distress.       Cardiovascular: Normal rate and regular rhythm.     Pulmonary/Chest: Effort normal.        Abdominal: Soft. She " exhibits no distension and no abdominal incision. There is no tenderness.     Genitourinary: Vagina normal.                Skin: She is not diaphoretic.

## 2017-11-14 NOTE — LACTATION NOTE
Seen pt for lactation counseling.  Started to  with basic breastfeeding instructions using breastfeeding manual.  But pt verbalized she feels sleepy.  RN verbalized that ebm is needed for baby's feeding.  Assisted pt in hand expression, collected 12 mls sent to baby's bedside. Baby's nurse to feed via cup. BM noted to have tor pipe color.  Rest promoted.  Gave global video link on hand expression and showed breastfeeding manual on hand expression if she needs to HE again but encouraged to have the baby to latch on. Pt verbalized understanding.      11/13/17 2010   Maternal Infant Assessment   Breast Shape Bilateral:;pendulous;other (see comments)  (ectopic breast tissue-axilla)   Breast Density Bilateral:;soft   Areola Bilateral:;elastic   Nipple(s) Bilateral:;graspable   Maternal Infant Feeding   Maternal Emotional State assist needed   Breast Milk Supply Volume (ml) 12 ml   Time Spent (min) 15-30 min   Breastfeeding Education milk expression, hand;other (see comments)  (basics)   Lactation Referrals   Lactation Consult Breastfeeding assessment;Initial assessment   Lactation Interventions   Maternal Breastfeeding Support lactation counseling provided

## 2017-11-14 NOTE — ANESTHESIA POSTPROCEDURE EVALUATION
"Anesthesia Post Evaluation    Patient: Yvette Reyes    Procedure(s) Performed: * No procedures listed *    Final Anesthesia Type: epidural  Patient location during evaluation: floor  Patient participation: Yes- Able to Participate  Level of consciousness: awake and alert and oriented  Post-procedure vital signs: reviewed and stable  Pain management: adequate  Airway patency: patent  PONV status at discharge: No PONV  Anesthetic complications: no      Cardiovascular status: blood pressure returned to baseline and hemodynamically stable  Respiratory status: unassisted, spontaneous ventilation and room air  Hydration status: euvolemic  Follow-up not needed.        Visit Vitals  /74   Pulse 103   Temp 36.8 °C (98.3 °F) (Oral)   Resp 18   Ht 5' 2" (1.575 m)   Wt 96.1 kg (211 lb 13.8 oz)   LMP 02/09/2017 (Exact Date)   SpO2 98%   Breastfeeding? Yes   BMI 38.75 kg/m²       Pain/Farideh Score: Presence of Pain: complains of pain/discomfort (11/13/2017  1:30 AM)  Pain Rating Prior to Med Admin: 7 (11/14/2017 12:40 PM)  Pain Rating Post Med Admin: 7 (11/14/2017  1:18 PM)      "

## 2017-11-14 NOTE — DISCHARGE INSTRUCTIONS
Breastfeeding Discharge Instructions       Feed the baby at the earliest sign of hunger or comfort  o Hands to mouth, sucking motions  o Rooting or searching for something to suck on  o Dont wait for crying - it is a late sign of hunger and comfort.     The feedings may be 8-12 times per 24hrs and will not follow a schedule   Avoid pacifiers and bottles for the first 4 weeks   Alternate the breast you start the feeding with, or start with the breast that feels the fullest   Switch breasts when the baby takes himself off the breast or falls asleep   Keep offering breasts until the baby looks full, no longer gives hunger signs, and stays asleep when placed on his back in the crib   If the baby is sleepy and wont wake for a feeding, put the baby skin-to-skin dressed in a diaper against the mothers bare chest   Sleep near your baby   The baby should be positioned and latched on to the breast correctly  o Chest-to-chest, chin in the breast  o Babys lips are flipped outward  o Babys mouth is stretched open wide like a shout  o Babys sucking should feel like tugging to the mother  - The baby should be drinking at the breast:  o You should hear swallowing or gulping throughout the feeding  o You should see milk on the babys lips when he comes off the breast  o Your breasts should be softer when the baby is finished feeding  o The baby should look relaxed at the end of feedings  o After the 4th day and your milk is in:  o The babys poop should turn bright yellow and be loose, watery, and seedy  o The baby should have at least 3-4 poops the size of the palm of your hand per day  o The baby should have at least 6-8 wet diapers per day  o The urine should be light yellow in color  You should drink when you are thirsty and eat a healthy diet when you are    hungry.     Take naps to get the rest you need.   Take medications and/or drink alcohol only with permission of your obstetrician    or the babys  pediatrician.  You can also call the Infant Risk Center,   ([phone]), Monday-Friday, 8am-5pm Central time, to get the most   up-to-date evidence-based information on the use of medications during   pregnancy and breastfeeding.      The baby should be examined by a pediatrician at 3-5 days of age.  Once your   milk comes in, the baby should be gaining at least ½ - 1oz each day and should be back to birthweight no later than 10-14 days of age.          Community Resources    [facility] Breastfeeding Warmline: [phone]   Local [facility] clinics: provide incentives and breastpumps to eligible mothers  La Leche League International (LLLI):  mother-to-mother support group website        www.Speak With Mel.Odeo  Local La Leche League mother-to-mother support groups:        www.[place] [facility].LayerGloss        La Leche League [facility]   Dr. Farhan Woods website for latch videos and general information:        www.breastfeedinginc.ca  [facility]ant Risk Center is a call center that provides information about the safety of taking medications while breastfeeding.  Call 2-734-004-6042, M-F, 8am-5pm, [place].  International Lactation Consultant Association provides resources for assistance:        www.ilca.org  [facility] Breastfeeding Coalition provides informationand resources for parents  and the community    http://louisianabreastfeeding.org     Ronda Salazar is a mom-to-mom support group:                             www.nodanishaPhlexglobal.com//breastfeedng-support/  [employer] for Healthy Babies:  8-419-888-BABY(3753)  [place] [employer] [place]: a breastfeeding support group for women of color, 154.213.8479.

## 2017-11-14 NOTE — ASSESSMENT & PLAN NOTE
Postpartum care:  - Patient doing well. Continue routine management and advances.  - Continue PO pain meds. Pain well controlled.  - Encourage ambulation.   - Heme: Pre Delivery h/h 13/40 -> 10/32  - Circumcision - declined   - Contraception - defer to Dr Sawyer  - Lactation - The patient is breast feeding. Lactation nurse following along PRN  - Rh Status - Pos

## 2017-11-14 NOTE — PROGRESS NOTES
"[facility]  Obstetrics  Postpartum Progress Note    Patient Name: Yvette Reyes  MRN: [id number]  Admission Date: 2017  Hospital Length of Stay: 1 days  Attending Physician: Alan Sawyer Jr., MD  Primary Care Provider: Primary Doctor No    Subjective:     Principal Problem: (spontaneous vaginal delivery)    Hospital course: No notes on file    Interval History:     She is doing well this morning. She is tolerating a regular diet without nausea or vomiting. She is voiding spontaneously. She is ambulating. She has passed flatus, and has not had a BM. Vaginal bleeding is mild. She denies fever or chills. Abdominal pain is moderate and controlled with oral medications. She is breastfeeding. She desires circumcision for her male baby: no.    Patient with neck pain during and after delivery. Patient describes as "sprain." Denies neurological symptoms. Resolved with massage but persists with sudden movements. Desires "something else" for the pain.     Objective:     Vital Signs (Most Recent):  Temp: 99.4 °F (37.4 °C) (17)  Pulse: 92 (17)  Resp: 12 (17)  BP: (!) 98/58 (17)  SpO2: 99 % (17) Vital Signs (24h Range):  Temp:  [98.5 °F (36.9 °C)-99.4 °F (37.4 °C)] 99.4 °F (37.4 °C)  Pulse:  [] 92  Resp:  [12-20] 12  SpO2:  [98 %-100 %] 99 %  BP: ()/(58-79) 98/58     Weight: 96.1 kg (211 lb 13.8 oz)  Body mass index is 38.75 kg/m².      Intake/Output Summary (Last 24 hours) at 17 0643  Last data filed at 17   Gross per 24 hour   Intake                0 ml   Output             1858 ml   Net            -1858 ml       Significant Labs:  Lab Results   Component Value Date    GROUPTRH B POS 2017    HEPBSAG Negative 2017    STREPBCULT  10/13/2017     STREPTOCOCCUS AGALACTIAE (GROUP B)  Beta-hemolytic streptococci are routinely susceptible to   penicillins,cephalosporins and carbapenems.         Recent " Labs  Lab 17  0519   HGB 10.4*   HCT 32.0*       I have personallly reviewed all pertinent lab results from the last 24 hours.    Physical Exam:   Constitutional: She appears well-developed and well-nourished. No distress.       Cardiovascular: Normal rate and regular rhythm.     Pulmonary/Chest: Effort normal.        Abdominal: Soft. She exhibits no distension and no abdominal incision. There is no tenderness.     Genitourinary: Vagina normal.                Skin: She is not diaphoretic.        Assessment/Plan:     32 y.o. female  for:    *  (spontaneous vaginal delivery)    Postpartum care:  - Patient doing well. Continue routine management and advances.  - Continue PO pain meds. Pain well controlled.  - Encourage ambulation.   - Heme: Pre Delivery h/h  -> 10/32  - Circumcision - declined   - Contraception - defer to Dr Sawyer  - Lactation - The patient is breast feeding. Lactation nurse following along PRN  - Rh Status - Pos                Neck pain    - Continue flexeril  - Continue normal pain medication            Disposition: As patient meets milestones, will plan to discharge PPD 1-2.    Rei Kat MD  Obstetrics  [facility]

## 2017-11-14 NOTE — PROGRESS NOTES
Notified dr espana that pt c/o severe neck pain 8/10 , cannot adjust bed due to c/o pain, pt has rolled towel behind neck for support, stated pain has been there for many hours, began after laying flat after epidural, when sat up pain began, brought warm pack , was given vicodin 5mg in l&d but not much relief, unable to assess fundus due to pt sitting up, bleeding minimal , also pt c/o tenderness to left calf w/positive homans sign. Stated she would come evaluate pt,  Anesthesia was also notified of above, dr srivastava came to pt's bedside at 1830 for evaluation,

## 2017-11-14 NOTE — PLAN OF CARE
Problem: Patient Care Overview  Goal: Plan of Care Review  Outcome: Ongoing (interventions implemented as appropriate)  To breastfeed baby 8x or more in 24 hours, feed on cue. To practice correct latch and positioning with breast compression during feeding and skin to skin during feeding.  To observe for signs of milk transfer during feeding.  To call for further breastfeeding assistance/ support if needed.  If unable to latch: to hand express EBM and give via cup or spoon.

## 2017-11-14 NOTE — LACTATION NOTE
"Seen pt for lactation counseling. Pt complains of neck pain and could not latch baby by herself neither hand express her milk. Assessed for readiness to latch but baby was sleepy and no suckles on suckling assessment. Checked baby's temp at 96.8, triple swaddled and temp was rechecked by bedside RN at 97.2.   Taught companions at room ( and pt's mother), they were able to demonstrate it too. Collected about 5 mls and gave to baby via cup. Started with  cheek and suckling exercises again and baby suckles a few on finger. Placed skin to skin with pt and assisted with latch. Seeing progress from weak suckles to more sustained strong suckles, observed for about 10 minutes.  Discussed to pt signs of milk transfer. Reviewed basics and encouraged skin to skin care.  Answered all questions.  Pt verbalized understanding and gave lactation number to call.     17 1000   Maternal Infant Assessment   Breast Shape Bilateral:;pendulous  (bilateral axillary accessory tissue noted)   Breast Density Bilateral:;soft   Areola Bilateral:;elastic   Nipple(s) Bilateral:;graspable   Infant Assessment   Sucking Reflex present   Rooting Reflex present   LATCH Score   Latch 1-->repeated attempts, holds nipple in mouth, stimulate to suck   Audible Swallowing 0-->none   Type Of Nipple 2-->everted (after stimulation)   Comfort (Breast/Nipple) 2-->soft/nontender   Hold (Positioning) 0-->full assist (staff holds infant at breast)   Score (less than 7 for 2/more consecutive times, consult Lactation Consultant) 5   Maternal Infant Feeding   Maternal Emotional State assist needed;other (see comments)  (can't hand express or latch by herself, complains neckpain)   Infant Positioning clutch/"football"   Breast Milk Supply Volume (ml) 5 ml   Nipple Shape After Feeding, Right salty   Breastfeeding Education importance of skin-to-skin contact;milk expression, hand;adequate infant intake   Feeding Infant   Feeding Readiness Cues " crying;rooting   Feeding Tolerance/Success arousal required;sleepy   Lactation Referrals   Lactation Consult Follow up   Lactation Interventions   Attachment Promotion breastfeeding assistance provided;skin-to-skin contact encouraged   Breast Care: Breastfeeding manual expression to soften breast   Breastfeeding Assistance milk expression/pumping;feeding session observed;assisted with positioning;support offered   Maternal Breastfeeding Support encouragement offered;lactation counseling provided   Latch Promotion suck stimulated with colostrum drop

## 2017-11-15 VITALS
HEIGHT: 62 IN | SYSTOLIC BLOOD PRESSURE: 113 MMHG | OXYGEN SATURATION: 99 % | DIASTOLIC BLOOD PRESSURE: 73 MMHG | TEMPERATURE: 98 F | HEART RATE: 81 BPM | WEIGHT: 211.88 LBS | RESPIRATION RATE: 18 BRPM | BODY MASS INDEX: 38.99 KG/M2

## 2017-11-15 PROBLEM — M54.2 NECK PAIN: Status: ACTIVE | Noted: 2017-11-15

## 2017-11-15 PROCEDURE — 25000003 PHARM REV CODE 250: Performed by: STUDENT IN AN ORGANIZED HEALTH CARE EDUCATION/TRAINING PROGRAM

## 2017-11-15 PROCEDURE — 25000003 PHARM REV CODE 250: Performed by: OBSTETRICS & GYNECOLOGY

## 2017-11-15 PROCEDURE — 25000003 PHARM REV CODE 250: Performed by: ANESTHESIOLOGY

## 2017-11-15 RX ORDER — GUAIFENESIN/DEXTROMETHORPHAN 100-10MG/5
5 SYRUP ORAL 4 TIMES DAILY PRN
Qty: 120 ML | Refills: 0 | COMMUNITY
Start: 2017-11-15 | End: 2017-11-25

## 2017-11-15 RX ORDER — OXYCODONE AND ACETAMINOPHEN 5; 325 MG/1; MG/1
1 TABLET ORAL EVERY 4 HOURS PRN
Qty: 20 TABLET | Refills: 0 | Status: SHIPPED | OUTPATIENT
Start: 2017-11-15 | End: 2017-12-04

## 2017-11-15 RX ADMIN — METHOCARBAMOL 500 MG: 500 TABLET ORAL at 04:11

## 2017-11-15 RX ADMIN — HYDROCODONE BITARTRATE AND ACETAMINOPHEN 1 TABLET: 10; 325 TABLET ORAL at 04:11

## 2017-11-15 RX ADMIN — DOCUSATE SODIUM 200 MG: 100 CAPSULE, LIQUID FILLED ORAL at 04:11

## 2017-11-15 RX ADMIN — METHOCARBAMOL 500 MG: 500 TABLET ORAL at 11:11

## 2017-11-15 RX ADMIN — HYDROCODONE BITARTRATE AND ACETAMINOPHEN 1 TABLET: 10; 325 TABLET ORAL at 03:11

## 2017-11-15 RX ADMIN — METHOCARBAMOL 500 MG: 500 TABLET ORAL at 01:11

## 2017-11-15 RX ADMIN — HYDROCODONE BITARTRATE AND ACETAMINOPHEN 1 TABLET: 10; 325 TABLET ORAL at 09:11

## 2017-11-15 RX ADMIN — NAPROXEN SODIUM 550 MG: 275 TABLET ORAL at 04:11

## 2017-11-15 RX ADMIN — NAPROXEN SODIUM 550 MG: 275 TABLET ORAL at 08:11

## 2017-11-15 RX ADMIN — DOCUSATE SODIUM 200 MG: 100 CAPSULE, LIQUID FILLED ORAL at 08:11

## 2017-11-15 NOTE — DISCHARGE SUMMARY
[facility]  Obstetrics  Discharge Summary      Patient Name: Yvette Reyes  MRN: [id number]  Admission Date: 2017  Hospital Length of Stay: 2 days  Discharge Date and Time: 11/15/2017  Attending Physician: Alan Sawyer Jr., MD   Discharging Provider: Rei Kat MD  Primary Care Provider: Primary Doctor No    HPI: Yvette Reyes is a 32 y.o. [id number] at 39w4d presents complaining of contractions and leakage of fluid. She reports the contractions started at 6 minutes apart and are increasing in frequency. She noticed an initial dampness in her underwear at 0030 today and has felt continued leakage since then. She also complains of bleeding consistent with blood show. This IUP is complicated by GBS+ status.      Membranes found to be ruptured on exam in the OB ED. Patient having painful contractions with cervix dilated to 5 cm.    * No surgery found *     Hospital Course:   No notes on file    Consults         Status Ordering Provider     Consult to Lactation  Use PRN     Provider:  (Not yet assigned)    BRAYDEN Johnson          Final Active Diagnoses:    Diagnosis Date Noted POA    PRINCIPAL PROBLEM:   (spontaneous vaginal delivery) [O80] 2017 Not Applicable    Neck pain [M54.2] 2017 Unknown      Problems Resolved During this Admission:    Diagnosis Date Noted Date Resolved POA    Rupture of membranes with clear amniotic fluid [O42.019] 2017 Yes        Labs: All labs within the past 24 hours have been reviewed    Feeding Method: breast    Immunizations     Date Immunization Status Dose Route/Site Given by    17 1305 MMR Incomplete 0.5 mL Subcutaneous/Left deltoid     17 1305 Tdap Incomplete 0.5 mL Intramuscular/Left deltoid           Delivery:    Episiotomy: None   Lacerations: 2nd   Repair suture:     Repair # of packets: 3   Blood loss (ml): 358     Birth information:  YOB: 2015   Time of birth: 12:01  PM   Sex: male   Delivery type: Vaginal, Spontaneous Delivery   Gestational Age: 39w4d    Delivery Clinician:      Other providers:       Additional  information:  Forceps:    Vacuum:    Breech:    Observed anomalies      Living?:           APGARS  One minute Five minutes Ten minutes   Skin color:         Heart rate:         Grimace:         Muscle tone:         Breathing:         Totals:           Placenta: Delivered:       appearance    Pending Diagnostic Studies:     None          Discharged Condition: good    Disposition: Home or Self Care    Follow Up:  Follow-up Information     Follow up In 6 weeks.    Why:  Post Partum Visit               Patient Instructions:     Diet general     Activity as tolerated     Call MD for:  temperature >100.4     Call MD for:  persistent nausea and vomiting or diarrhea     Call MD for:  severe uncontrolled pain     Call MD for:  redness, tenderness, or signs of infection (pain, swelling, redness, odor or green/yellow discharge around incision site)       Medications:  Current Discharge Medication List      START taking these medications    Details   dextromethorphan-guaifenesin  mg/5 ml (ROBITUSSIN-DM)  mg/5 mL liquid Take 5 mLs by mouth 4 (four) times daily as needed (cough).  Qty: 120 mL, Refills: 0         CONTINUE these medications which have NOT CHANGED    Details   PNV 11-iron fum-folic acid-om3 (VIVA DHA) 28-1-200 mg Cap Take 1 capsule by mouth once daily.  Qty: 30 each, Refills: 12                        Rei Kat MD  Obstetrics  [facility]      See progress not has neck pain questions about stitches., ready for D/C.

## 2017-11-15 NOTE — SUBJECTIVE & OBJECTIVE
Hospital course: No notes on file    Interval History:     She is doing well this morning. She is tolerating a regular diet without nausea or vomiting. She is voiding spontaneously. She is ambulating. She has passed flatus, and has not had a BM. Vaginal bleeding is mild. She denies fever or chills. Abdominal pain is moderate and controlled with oral medications. She is breastfeeding. She desires circumcision for her male baby: no.    Patient with neck pain resolved. Having mild cough.     Objective:     Vital Signs (Most Recent):  Temp: 98 °F (36.7 °C) (11/14/17 2300)  Pulse: 71 (11/14/17 2300)  Resp: 18 (11/14/17 2300)  BP: 124/74 (11/14/17 2300)  SpO2: 99 % (11/14/17 2300) Vital Signs (24h Range):  Temp:  [98 °F (36.7 °C)-98.3 °F (36.8 °C)] 98 °F (36.7 °C)  Pulse:  [] 71  Resp:  [18] 18  SpO2:  [98 %-100 %] 99 %  BP: (115-124)/(58-74) 124/74     Weight: 96.1 kg (211 lb 13.8 oz)  Body mass index is 38.75 kg/m².    No intake or output data in the 24 hours ending 11/15/17 0659    Significant Labs:  Lab Results   Component Value Date    GROUPTRH B POS 11/13/2017    HEPBSAG Negative 03/21/2017    STREPBCULT  10/13/2017     STREPTOCOCCUS AGALACTIAE (GROUP B)  Beta-hemolytic streptococci are routinely susceptible to   penicillins,cephalosporins and carbapenems.         Recent Labs  Lab 11/14/17  0519   HGB 10.4*   HCT 32.0*       I have personallly reviewed all pertinent lab results from the last 24 hours.    Physical Exam:   Constitutional: She appears well-developed and well-nourished. No distress.       Cardiovascular: Normal rate and regular rhythm.     Pulmonary/Chest: Effort normal.        Abdominal: Soft. She exhibits no distension and no abdominal incision. There is no tenderness.     Genitourinary: Vagina normal.                Skin: She is not diaphoretic.

## 2017-11-15 NOTE — LACTATION NOTE
Seen pt for follow up counseling.  Pt verbalized difficulty in hand expression and latching the baby.  Support offered. Latch assisted provided, but baby was very sleep and won't suck on pt's breast.  Started use of pump for stimulation. Set pt's expectation in pump use and discussed lactogenesis. Discussed cleaning pump parts and pump use.  Pt verbalized understanding.       11/14/17 1501   Maternal Infant Feeding   Maternal Emotional State assist needed   Time Spent (min) 30-60 min   Latch Assistance yes   Breastfeeding Education adequate milk volume;adequate infant intake;importance of skin-to-skin contact;milk expression, hand;milk expression, electric pump   Feeding Infant   Feeding Tolerance/Success sleepy   Skin-to-Skin Contact During Feeding yes   Equipment Type/Education   Pump Type Symphony   Breast Pump Type double electric, hospital grade   Breast Pump Flange Type hard   Breast Pump Flange Size 24 mm   Breast Pumping Bilateral Breasts:  (hands on pumping)   Lactation Referrals   Lactation Consult Knowledge deficit;Follow up;Pump teaching   Lactation Interventions   Maternal Breastfeeding Support lactation counseling provided

## 2017-11-15 NOTE — LACTATION NOTE
Seen pt again for follow up care. Pt verbalized difficulty on latching. Assisted on latching and baby went on the breast and stayed with sustained suckles this time.  Provided support and encouragement.       11/14/17 1600   Infant Assessment   Sucking Reflex present   Rooting Reflex present   LATCH Score   Latch 1-->repeated attempts, holds nipple in mouth, stimulate to suck   Audible Swallowing 1-->a few with stimulation   Type Of Nipple 2-->everted (after stimulation)   Comfort (Breast/Nipple) 2-->soft/nontender   Hold (Positioning) 0-->full assist (staff holds infant at breast)   Score (less than 7 for 2/more consecutive times, consult Lactation Consultant) 6   Maternal Infant Feeding   Maternal Emotional State assist needed   Infant Positioning cross-cradle   Signs of Milk Transfer infant jaw motion present   Time Spent (min) 15-30 min   Latch Assistance yes   Breastfeeding Education other (see comments)  (latch assist)   Lactation Referrals   Lactation Consult Knowledge deficit   Lactation Interventions   Maternal Breastfeeding Support lactation counseling provided

## 2017-11-15 NOTE — LACTATION NOTE
"   11/15/17 0950   Maternal Infant Assessment   Breast Shape Bilateral:;round   Breast Density Bilateral:;soft   Areola Bilateral:;elastic   Nipple(s) Bilateral:;everted   Infant Assessment   Sucking Reflex present   Rooting Reflex present   Swallow Reflex present   LATCH Score   Latch 1-->repeated attempts, holds nipple in mouth, stimulate to suck   Audible Swallowing 1-->a few with stimulation   Type Of Nipple 2-->everted (after stimulation)   Comfort (Breast/Nipple) 2-->soft/nontender   Hold (Positioning) 1-->minimal assist, teach one side: mother does other, staff holds  (max help)   Score (less than 7 for 2/more consecutive times, consult Lactation Consultant) 7   Maternal Infant Feeding   Maternal Emotional State assist needed   Infant Positioning clutch/"football";cross-cradle   Time Spent (min) 30-60 min   Latch Assistance yes   Breastfeeding History   Currently Breastfeeding yes   Feeding Infant   Effective Latch During Feeding yes   Audible Swallow yes   Suck/Swallow Coordination present   Equipment Type/Education   Pump Type Symphony   Breast Pump Type double electric, personal   Breast Pump Flange Type hard   Breast Pump Flange Size 24 mm   Pumping Frequency (times) (8 or more times daily)   Lactation Referrals   Lactation Consult Breastfeeding assessment   Lactation Interventions   Attachment Promotion breastfeeding assistance provided;counseling provided   Breastfeeding Assistance feeding cue recognition promoted;assisted with positioning;infant latch-on verified;infant suck/swallow verified;support offered   Maternal Breastfeeding Support lactation counseling provided   Latch Promotion positioning assisted;infant moved to breast;suck stimulated with colostrum drop   max assistance needed in order for baby to latch to breast. Multiple latch attempts need. Discussed with pt if baby does not latch or breastfeed after 15 minutes of trying, she needs to pump and supplement baby with ebm.  Pt to pump at " least 6 times daily for 15 minutes for extra stimulation since baby is not always effectively nursing. Discharge lactation education given. Questions answered. Pt's breastpump demo done.

## 2017-11-15 NOTE — PROGRESS NOTES
[facility]  Obstetrics  Postpartum Progress Note    Patient Name: Yvette Reyes  MRN: [id number]  Admission Date: 2017  Hospital Length of Stay: 2 days  Attending Physician: Alan Sawyer Jr., MD  Primary Care Provider: Primary Doctor No    Subjective:     Principal Problem: (spontaneous vaginal delivery)    Hospital course: No notes on file    Interval History:     She is doing well this morning. She is tolerating a regular diet without nausea or vomiting. She is voiding spontaneously. She is ambulating. She has passed flatus, and has not had a BM. Vaginal bleeding is mild. She denies fever or chills. Abdominal pain is moderate and controlled with oral medications. She is breastfeeding. She desires circumcision for her male baby: no.    Patient with neck pain resolved. Having mild cough.     Objective:     Vital Signs (Most Recent):  Temp: 98 °F (36.7 °C) (17 2300)  Pulse: 71 (17 2300)  Resp: 18 (17 2300)  BP: 124/74 (17 2300)  SpO2: 99 % (17 2300) Vital Signs (24h Range):  Temp:  [98 °F (36.7 °C)-98.3 °F (36.8 °C)] 98 °F (36.7 °C)  Pulse:  [] 71  Resp:  [18] 18  SpO2:  [98 %-100 %] 99 %  BP: (115-124)/(58-74) 124/74     Weight: 96.1 kg (211 lb 13.8 oz)  Body mass index is 38.75 kg/m².    No intake or output data in the 24 hours ending 11/15/17 0659    Significant Labs:  Lab Results   Component Value Date    GROUPTRH B POS 2017    HEPBSAG Negative 2017    STREPBCULT  10/13/2017     STREPTOCOCCUS AGALACTIAE (GROUP B)  Beta-hemolytic streptococci are routinely susceptible to   penicillins,cephalosporins and carbapenems.         Recent Labs  Lab 17  0519   HGB 10.4*   HCT 32.0*       I have personallly reviewed all pertinent lab results from the last 24 hours.    Physical Exam:   Constitutional: She appears well-developed and well-nourished. No distress.       Cardiovascular: Normal rate and regular rhythm.     Pulmonary/Chest:  "Effort normal.        Abdominal: Soft. She exhibits no distension and no abdominal incision. There is no tenderness.     Genitourinary: Vagina normal.                Skin: She is not diaphoretic.        Assessment/Plan:     32 y.o. female  for:    *  (spontaneous vaginal delivery)    Postpartum care:  - Patient doing well. Continue routine management and advances.  - Continue PO pain meds. Pain well controlled.  - Encourage ambulation.   - Heme: Pre Delivery h/h  -> 10/32  - Circumcision - declined   - Contraception - defer to Dr Sawyer  - Lactation - The patient is breast feeding. Lactation nurse following along PRN  - Rh Status - Pos                Neck pain    - Continue flexeril  - Continue normal pain medication            Disposition: As patient meets milestones, will plan to discharge today.    Rei Kat MD  Obstetrics  [facility]    Doing well, C/O neck pain request naproxen, multiple questions about vaginal stitches. Was told the stitches for the "cyst" needed to be removed?  I have reviewed the resident's note, evaluated the patient and agree with the diagnosis and management plan     "

## 2017-11-16 ENCOUNTER — PATIENT MESSAGE (OUTPATIENT)
Dept: OBSTETRICS AND GYNECOLOGY | Facility: CLINIC | Age: 32
End: 2017-11-16

## 2017-11-17 ENCOUNTER — TELEPHONE (OUTPATIENT)
Dept: LACTATION | Facility: CLINIC | Age: 32
End: 2017-11-17

## 2017-11-17 ENCOUNTER — PATIENT MESSAGE (OUTPATIENT)
Dept: OBSTETRICS AND GYNECOLOGY | Facility: CLINIC | Age: 32
End: 2017-11-17

## 2017-11-17 RX ORDER — BENZONATATE 100 MG/1
100 CAPSULE ORAL 3 TIMES DAILY PRN
Qty: 30 CAPSULE | Refills: 2 | Status: SHIPPED | OUTPATIENT
Start: 2017-11-17 | End: 2017-11-27

## 2017-11-17 NOTE — TELEPHONE ENCOUNTER
Follow up care via phone done:    Situation: Pt called asking about formula feeding.  She was having difficult time breastfeeding. Pt verbalized that baby has not peed and stooled regularly.     Background: delivered 11/13/2017  BW 6lbs 8.4oz; Dw: 6lbs 0.7 oz (11/15 day 2); had latch difficulty because of limited mobility of the mom, she was complaining of neck pain.  Baby was able to breastfeed during counseling sessions with some help.    Action:  Discuss management to latch on baby, reinforced watching global health media breastfeeding video. Discussed management to stimulate breast to help with milk coming in (pump after nursing).  Reinforced all significant breastfeeding education given in the hospital.    Recommendation:  Contact Pediatrician for formula concerns and baby's output concerns.

## 2017-11-21 ENCOUNTER — POSTPARTUM VISIT (OUTPATIENT)
Dept: OBSTETRICS AND GYNECOLOGY | Facility: CLINIC | Age: 32
End: 2017-11-21
Payer: COMMERCIAL

## 2017-11-21 ENCOUNTER — HOSPITAL ENCOUNTER (EMERGENCY)
Facility: OTHER | Age: 32
Discharge: HOME | End: 2017-11-21
Attending: OBSTETRICS & GYNECOLOGY
Payer: COMMERCIAL

## 2017-11-21 VITALS
TEMPERATURE: 98 F | SYSTOLIC BLOOD PRESSURE: 118 MMHG | RESPIRATION RATE: 20 BRPM | DIASTOLIC BLOOD PRESSURE: 76 MMHG | HEART RATE: 78 BPM

## 2017-11-21 VITALS
DIASTOLIC BLOOD PRESSURE: 80 MMHG | HEIGHT: 62 IN | SYSTOLIC BLOOD PRESSURE: 136 MMHG | WEIGHT: 211 LBS | BODY MASS INDEX: 38.83 KG/M2

## 2017-11-21 DIAGNOSIS — M25.519 NECK AND SHOULDER PAIN: ICD-10-CM

## 2017-11-21 DIAGNOSIS — M54.2 NECK PAIN: ICD-10-CM

## 2017-11-21 DIAGNOSIS — Z98.890 POST-OPERATIVE STATE: Primary | ICD-10-CM

## 2017-11-21 DIAGNOSIS — M54.2 NECK AND SHOULDER PAIN: ICD-10-CM

## 2017-11-21 DIAGNOSIS — R51.9 NONINTRACTABLE EPISODIC HEADACHE, UNSPECIFIED HEADACHE TYPE: ICD-10-CM

## 2017-11-21 DIAGNOSIS — R51.9 NONINTRACTABLE HEADACHE, UNSPECIFIED CHRONICITY PATTERN, UNSPECIFIED HEADACHE TYPE: Primary | ICD-10-CM

## 2017-11-21 PROBLEM — O26.872 SHORT CERVIX IN SECOND TRIMESTER, ANTEPARTUM: Status: RESOLVED | Noted: 2017-07-20 | Resolved: 2017-11-21

## 2017-11-21 PROBLEM — B95.1 GBBS (GROUP B BETA HEMOLYTIC STREPTOCOCCUS) PHARYNGITIS: Status: RESOLVED | Noted: 2017-10-16 | Resolved: 2017-11-21

## 2017-11-21 PROBLEM — Z34.90 PREGNANT: Status: RESOLVED | Noted: 2017-04-18 | Resolved: 2017-11-21

## 2017-11-21 PROBLEM — J02.0 GBBS (GROUP B BETA HEMOLYTIC STREPTOCOCCUS) PHARYNGITIS: Status: RESOLVED | Noted: 2017-10-16 | Resolved: 2017-11-21

## 2017-11-21 PROCEDURE — 25000003 PHARM REV CODE 250: Performed by: STUDENT IN AN ORGANIZED HEALTH CARE EDUCATION/TRAINING PROGRAM

## 2017-11-21 PROCEDURE — 99284 EMERGENCY DEPT VISIT MOD MDM: CPT

## 2017-11-21 PROCEDURE — 99999 PR PBB SHADOW E&M-EST. PATIENT-LVL II: CPT | Mod: PBBFAC,,, | Performed by: OBSTETRICS & GYNECOLOGY

## 2017-11-21 PROCEDURE — 0503F POSTPARTUM CARE VISIT: CPT | Mod: S$GLB,,, | Performed by: OBSTETRICS & GYNECOLOGY

## 2017-11-21 RX ORDER — BUTALBITAL, ACETAMINOPHEN AND CAFFEINE 50; 325; 40 MG/1; MG/1; MG/1
1 TABLET ORAL EVERY 4 HOURS PRN
Qty: 10 TABLET | Refills: 0 | Status: SHIPPED | OUTPATIENT
Start: 2017-11-21 | End: 2017-12-01

## 2017-11-21 RX ORDER — BUTALBITAL, ACETAMINOPHEN AND CAFFEINE 50; 325; 40 MG/1; MG/1; MG/1
2 TABLET ORAL ONCE
Status: COMPLETED | OUTPATIENT
Start: 2017-11-21 | End: 2017-11-21

## 2017-11-21 RX ORDER — CYCLOBENZAPRINE HCL 10 MG
10 TABLET ORAL 3 TIMES DAILY PRN
Qty: 10 TABLET | Refills: 0 | Status: SHIPPED | OUTPATIENT
Start: 2017-11-21 | End: 2018-01-12

## 2017-11-21 RX ADMIN — BUTALBITAL, ACETAMINOPHEN AND CAFFEINE 2 TABLET: 50; 325; 40 TABLET ORAL at 12:11

## 2017-11-21 NOTE — PROGRESS NOTES
Called to OB ED to evaluate Ms. Reyes. Pt came to OB ED complaining of headache since one day after discharge. Pt describes the headache as coming and going (70% of the time it is fine). There is no pattern to the headache. It sounds mostly in the occipital region and down into shoulders. There is no nausea, vomiting, blurry vision, or photophobia associated with her headache. Pt does report it is slightly better when laying on her side, but she doesn't know if this is just because she is resting. Of note patient standing throughout the entire conversation. She has been taking aleve BID with minimal relief. Currently 3/10 pain and it is not constant. Discussed the normal course of post dural puncture headache. Her description and lack of an obvious post dural puncture make it highly unlikely her headache is related to her epidural. She is also 7 days out from her epidural. Most PDPHs are resolved in this time frame. She could have a tension headache or migraine or musculoskeletal in nature. Would recommend trying fioricet and if headaches persist may need to follow up with a specialist.   Case discussed with Dr. Hamlin.     Stephaine Johnson MD  Anesthesia Resident PGY-4

## 2017-11-21 NOTE — ED PROVIDER NOTES
Encounter Date: 2017       History   No chief complaint on file.    Yvette Reyes is a 32 y.o. [id number] at 39w4d presents complaining of HA and neck/shoulder pain. She says the neck and shoulder pain started when she was in labor, and made it difficult to push because it was so tense. When asked where the pain is, she gestures to her trapezoids and paraspinal muscles bilaterally. During labor and when she went home from the hospital, the pain in her shoulders and neck was 8/10. It has improved since then to 4-5/10. It is described as a painful tension across the back of her head and in her neck. Yesterday today she has also had a headache described as a band of pain across the top of her head. It does not consistently happen when she changes position, but often comes and goes spontaneously. She has had some relief with naproxen.  Patient's labor and delivery were uncomplicated.           Review of patient's allergies indicates:   Allergen Reactions    Motrin [ibuprofen]      Past Medical History:   Diagnosis Date    Short cervix --- VAGINAL PROGESTERONE 2017     No past surgical history on file.  Family History   Problem Relation Age of Onset    Breast cancer Neg Hx     Colon cancer Neg Hx     Ovarian cancer Neg Hx      Social History   Substance Use Topics    Smoking status: Never Smoker    Smokeless tobacco: Never Used    Alcohol use No     Review of Systems   Constitutional: Negative for chills, diaphoresis and fever.   HENT: Negative for nosebleeds and sore throat.    Eyes: Negative for photophobia and visual disturbance.   Respiratory: Negative for cough and shortness of breath.    Cardiovascular: Negative for chest pain.   Gastrointestinal: Negative for constipation, diarrhea, nausea and vomiting.   Genitourinary: Negative for dysuria, flank pain, vaginal bleeding, vaginal discharge and vaginal pain.   Musculoskeletal: Positive for neck pain. Negative for back pain.   Skin: Negative for  rash.   Neurological: Positive for headaches. Negative for syncope and weakness.   Hematological: Does not bruise/bleed easily.       Physical Exam   Temp:  [97.9 °F (36.6 °C)] 97.9 °F (36.6 °C)  Pulse:  [78] 78  Resp:  [20] 20  BP: (118-136)/(76-80) 118/76    Physical Exam    Nursing note and vitals reviewed.  Constitutional: She appears well-developed and well-nourished. She is not diaphoretic. No distress.   HENT:   Head: Normocephalic and atraumatic.   Eyes: Conjunctivae and EOM are normal. Pupils are equal, round, and reactive to light.   Neck: Normal range of motion.   Cardiovascular: Normal rate, regular rhythm, normal heart sounds and intact distal pulses.   Pulmonary/Chest: Breath sounds normal. No respiratory distress.   Abdominal: Soft. Bowel sounds are normal. She exhibits no distension. There is tenderness (minimal, with deep palpation. Appropriate for post-partum state).   Musculoskeletal: Normal range of motion.   Neurological: She is alert and oriented to person, place, and time. She has normal reflexes.   Skin: Skin is warm and dry. Capillary refill takes less than 2 seconds.   Psychiatric: She has a normal mood and affect. Her behavior is normal. Judgment and thought content normal.         ED Course   Procedures  Labs Reviewed - No data to display          Medical Decision Making:   ED Management:  Evaluated by anesthesia: low suspicion for spinal headache.  2 fioricet given at 12:45.  Patient reports good pain relief.  Discussed at length symptom that are cause to return to the OB ED.   A large component of her pain and concern is related to her neck/shoulder pain.  Will discharge with small number of Fioricet and Flexeril.  Patient is asked to follow up with Dr. Sawyer as needed.  Other:   I discussed test(s) with the performing physician.  I have discussed this case with another health care provider.              Attending Attestation:   Physician Attestation Statement for Resident:  As the  supervising MD   Physician Attestation Statement: I have personally seen and examined this patient.   I agree with the above history. -:   As the supervising MD I agree with the above PE.    As the supervising MD I agree with the above treatment, course, plan, and disposition.   -: Patient evaluated and found to be stable, agree with resident's assessment of postpartum headache with no s/s of pre-eclampsia or thrombotic disease and plan to discharge to home.  I was personally present during the critical portions of the procedure(s) performed by the resident and was immediately available in the ED to provide services and assistance as needed during the entire procedure.  I have reviewed and agree with the residents interpretation of the following: lab data.  I have reviewed the following: old records at this facility.                    ED Course      Clinical Impression:   The encounter diagnosis was Nonintractable headache, unspecified chronicity pattern, unspecified headache type.    Disposition:   Disposition: Discharged  Condition: Stable    Ken Camejo M.D.  Obstetrics & Gynecology  PGY-1                     Ken Camejo MD  Resident  11/22/17 0918       Marisol Zaldivar MD  11/26/17 4052

## 2017-11-21 NOTE — PROGRESS NOTES
"PT 1 WEEK S/P  WITH INCLUSION CYST REMOVAL.  HAS A "COLD" WITH COUGH. NECK PAIN IS STILL PRESENT (WHICH WAS INTENSE AFTER RECEIVING THE EPIDURAL)  AND INTESE HA WITH VERTICAL POSITION CHANGES.    ROS:  GENERAL: No fever, chills, fatigability or weight loss.  VULVAR: No pain, no lesions and no itching.  VAGINAL: No relaxation, no itching, no discharge, no abnormal bleeding and no lesions.  ABDOMEN: No abdominal pain. Denies nausea. Denies vomiting. No diarrhea. No constipation  BREAST: Denies pain. No lumps. No discharge.  URINARY: No incontinence, no nocturia, no frequency and no dysuria.  CARDIOVASCULAR: No chest pain. No shortness of breath. No leg cramps.  NEUROLOGICAL: No headaches. No vision changes.  The remainder of the review of systems was negative.    PE:  General Appearance: overweight And Well developed. Well nourished. In no acute distress.  Vulva: Lesions: No. HEALING WELL INCISON    PROCEDURES:    PLAN:     DIAGNOSIS:  1. Post-operative state    2. Nonintractable episodic headache, unspecified headache type    3. Neck pain        MEDICATIONS & ORDERS:         [facility]    "

## 2017-12-03 ENCOUNTER — PATIENT MESSAGE (OUTPATIENT)
Dept: OBSTETRICS AND GYNECOLOGY | Facility: CLINIC | Age: 32
End: 2017-12-03

## 2017-12-04 ENCOUNTER — POSTPARTUM VISIT (OUTPATIENT)
Dept: OBSTETRICS AND GYNECOLOGY | Facility: CLINIC | Age: 32
End: 2017-12-04
Payer: COMMERCIAL

## 2017-12-04 VITALS
SYSTOLIC BLOOD PRESSURE: 114 MMHG | DIASTOLIC BLOOD PRESSURE: 62 MMHG | BODY MASS INDEX: 36.1 KG/M2 | WEIGHT: 196.19 LBS | HEIGHT: 62 IN

## 2017-12-04 DIAGNOSIS — L72.0 INCLUSION CYST: Primary | ICD-10-CM

## 2017-12-04 PROCEDURE — 99999 PR PBB SHADOW E&M-EST. PATIENT-LVL III: CPT | Mod: PBBFAC,,, | Performed by: OBSTETRICS & GYNECOLOGY

## 2017-12-04 PROCEDURE — 99213 OFFICE O/P EST LOW 20 MIN: CPT | Mod: S$GLB,,, | Performed by: OBSTETRICS & GYNECOLOGY

## 2017-12-04 RX ORDER — MUPIROCIN CALCIUM 20 MG/G
CREAM TOPICAL
Qty: 30 G | Refills: 0 | Status: SHIPPED | OUTPATIENT
Start: 2017-12-04 | End: 2017-12-13 | Stop reason: SDUPTHER

## 2017-12-04 RX ORDER — SULFAMETHOXAZOLE AND TRIMETHOPRIM 800; 160 MG/1; MG/1
1 TABLET ORAL 2 TIMES DAILY
Qty: 28 TABLET | Refills: 1 | Status: SHIPPED | OUTPATIENT
Start: 2017-12-04 | End: 2018-01-12

## 2017-12-04 NOTE — PROGRESS NOTES
PT HERE WITH INCLUSION CYST SITE SWELLING AND NOW DRAINING.  NO FEVER. IS A BIT SORE.    ROS:  GENERAL: No fever, chills, fatigability or weight loss.  VULVAR: No pain, no lesions and no itching.  VAGINAL: No relaxation, no itching, no discharge, no abnormal bleeding and no lesions.  ABDOMEN: No abdominal pain. Denies nausea. Denies vomiting. No diarrhea. No constipation  BREAST: Denies pain. No lumps. No discharge.  URINARY: No incontinence, no nocturia, no frequency and no dysuria.  CARDIOVASCULAR: No chest pain. No shortness of breath. No leg cramps.  NEUROLOGICAL: No headaches. No vision changes.  The remainder of the review of systems was negative.    PE:  General Appearance: overweight And Well developed. Well nourished. In no acute distress.  Vulva: Lesions: YES  Urethral Meatus: Normal size. Normal location. No lesions. No prolapse.  Urethra: No masses. No tenderness. No prolapse. No scarring.  Bladder: No masses. No tenderness.  Vagina: Mucosa NI:yes discharge no, atrophy no, cystocele no or rectocele no. STICHES INTACT HEALING        PROCEDURES:    PLAN:     DIAGNOSIS:  1. Inclusion cyst        MEDICATIONS & ORDERS:  Orders Placed This Encounter    sulfamethoxazole-trimethoprim 800-160mg (BACTRIM DS) 800-160 mg Tab    mupirocin calcium 2% (BACTROBAN) 2 % cream     20 MIN D/W WITH PT AND  ABOUT TX. MAY CONSIDER REXCISION    FOLLOW-UP: With me in 2 WEEKS

## 2017-12-13 RX ORDER — MUPIROCIN CALCIUM 20 MG/G
CREAM TOPICAL
Qty: 30 G | Refills: 0 | Status: SHIPPED | OUTPATIENT
Start: 2017-12-13 | End: 2018-01-12

## 2017-12-15 ENCOUNTER — TELEPHONE (OUTPATIENT)
Dept: OBSTETRICS AND GYNECOLOGY | Facility: CLINIC | Age: 32
End: 2017-12-15

## 2017-12-15 RX ORDER — MUPIROCIN 20 MG/G
OINTMENT TOPICAL 3 TIMES DAILY
Qty: 30 G | Refills: 4 | Status: SHIPPED | OUTPATIENT
Start: 2017-12-15 | End: 2018-01-12

## 2017-12-15 NOTE — TELEPHONE ENCOUNTER
----- Message from Vicenta Zabala LPN sent at 12/14/2017 11:09 AM CST -----  Contact: Sylvia Garvin)      ----- Message -----  From: Elvis Vernon  Sent: 12/13/2017   4:01 PM  To: Silverio FERNANDEZ  Staff    X_ 1st Request  _ 2nd Request  _ 3rd Request    Who: Sylvia Garvin)    Why: Prescription change for BACTROBAN cream to the ointment preferably    What Number to Call Back: [phone]    When to Expect a call back: (Before the end of the day)  -- if call after 3:00 call back will be tomorrow.

## 2017-12-18 ENCOUNTER — PATIENT MESSAGE (OUTPATIENT)
Dept: OBSTETRICS AND GYNECOLOGY | Facility: CLINIC | Age: 32
End: 2017-12-18

## 2017-12-22 ENCOUNTER — POSTPARTUM VISIT (OUTPATIENT)
Dept: OBSTETRICS AND GYNECOLOGY | Facility: CLINIC | Age: 32
End: 2017-12-22
Payer: COMMERCIAL

## 2017-12-22 VITALS
BODY MASS INDEX: 36.31 KG/M2 | SYSTOLIC BLOOD PRESSURE: 116 MMHG | WEIGHT: 197.31 LBS | HEIGHT: 62 IN | DIASTOLIC BLOOD PRESSURE: 64 MMHG

## 2017-12-22 DIAGNOSIS — L72.0 INCLUSION CYST: ICD-10-CM

## 2017-12-22 PROCEDURE — 99999 PR PBB SHADOW E&M-EST. PATIENT-LVL III: CPT | Mod: PBBFAC,,, | Performed by: OBSTETRICS & GYNECOLOGY

## 2017-12-22 PROCEDURE — 0503F POSTPARTUM CARE VISIT: CPT | Mod: S$GLB,,, | Performed by: OBSTETRICS & GYNECOLOGY

## 2017-12-22 NOTE — PROGRESS NOTES
PT HERE PP VISIT.  AREA CON'T TO DRAIN. USED TOPICAL AND TOOK BACTRIUM.    ROS:  GENERAL: No fever, chills, fatigability or weight loss.  VULVAR: No pain, no lesions and no itching.  VAGINAL: No relaxation, no itching, no discharge, no abnormal bleeding and no lesions.  ABDOMEN: No abdominal pain. Denies nausea. Denies vomiting. No diarrhea. No constipation  BREAST: Denies pain. No lumps. No discharge.  URINARY: No incontinence, no nocturia, no frequency and no dysuria.  CARDIOVASCULAR: No chest pain. No shortness of breath. No leg cramps.  NEUROLOGICAL: No headaches. No vision changes.  The remainder of the review of systems was negative.    PE:  General Appearance: overweight And Well developed. Well nourished. In no acute distress.  Vulva: Lesions: STILL DRAINING. SKIN DENUDED. NO INFECTION  Urethral Meatus: Normal size. Normal location. No lesions. No prolapse.  Urethra: No masses. No tenderness. No prolapse. No scarring.  Bladder: No masses. No tenderness.  Vagina: Mucosa NI:yes discharge no, atrophy no, cystocele no or rectocele no. LIGHT MENSES. SUTURE INTACT  Cervix: Lesion: no  Stenotic: no Cervical motion tenderness: no  Uterus: Uterus size: 6 weeks. Support good. Uterus size: Normal  Adnexa: Masses: No Tenderness: No CDS Nodularity: No  Abdomen: overweight No masses. No tenderness.      PROCEDURES:    PLAN:     DIAGNOSIS:  1. Postpartum care and examination immediately after delivery    2. Inclusion cyst        MEDICATIONS & ORDERS:  Orders Placed This Encounter    MRI Pelvis W WO Contrast       Patient was counseled today on the new ACS guidelines for cervical cytology screening as well as the current recommendations for breast cancer screening. She was counseled to follow up with her PCP for other routine health maintenance. Counseling session lasted approximately 10 minutes, and all her questions were answered.       FOLLOW-UP: With me in 3 WEEKS

## 2018-01-03 ENCOUNTER — PATIENT MESSAGE (OUTPATIENT)
Dept: OBSTETRICS AND GYNECOLOGY | Facility: CLINIC | Age: 33
End: 2018-01-03

## 2018-01-08 ENCOUNTER — TELEPHONE (OUTPATIENT)
Dept: OBSTETRICS AND GYNECOLOGY | Facility: CLINIC | Age: 33
End: 2018-01-08

## 2018-01-08 DIAGNOSIS — N90.7 INCLUSION CYST OF VULVA: Primary | ICD-10-CM

## 2018-01-10 ENCOUNTER — HOSPITAL ENCOUNTER (OUTPATIENT)
Dept: PREADMISSION TESTING | Facility: OTHER | Age: 33
Discharge: HOME | End: 2018-01-10
Attending: OBSTETRICS & GYNECOLOGY
Payer: COMMERCIAL

## 2018-01-10 ENCOUNTER — OFFICE VISIT (OUTPATIENT)
Dept: OBSTETRICS AND GYNECOLOGY | Facility: CLINIC | Age: 33
End: 2018-01-10
Payer: COMMERCIAL

## 2018-01-10 ENCOUNTER — HOSPITAL ENCOUNTER (OUTPATIENT)
Dept: RADIOLOGY | Facility: OTHER | Age: 33
Discharge: HOME | End: 2018-01-10
Attending: OBSTETRICS & GYNECOLOGY
Payer: COMMERCIAL

## 2018-01-10 DIAGNOSIS — K60.4 RECTAL FISTULA: Primary | ICD-10-CM

## 2018-01-10 DIAGNOSIS — L72.0 INCLUSION CYST: ICD-10-CM

## 2018-01-10 PROBLEM — M54.2 NECK PAIN: Status: RESOLVED | Noted: 2017-11-15 | Resolved: 2018-01-10

## 2018-01-10 PROCEDURE — A9585 GADOBUTROL INJECTION: HCPCS | Performed by: OBSTETRICS & GYNECOLOGY

## 2018-01-10 PROCEDURE — 99999 PR PBB SHADOW E&M-EST. PATIENT-LVL II: CPT | Mod: PBBFAC,,, | Performed by: OBSTETRICS & GYNECOLOGY

## 2018-01-10 PROCEDURE — 72197 MRI PELVIS W/O & W/DYE: CPT | Mod: TC

## 2018-01-10 PROCEDURE — 99212 OFFICE O/P EST SF 10 MIN: CPT | Mod: S$GLB,,, | Performed by: OBSTETRICS & GYNECOLOGY

## 2018-01-10 PROCEDURE — 72197 MRI PELVIS W/O & W/DYE: CPT | Mod: 26,,, | Performed by: RADIOLOGY

## 2018-01-10 PROCEDURE — 25500020 PHARM REV CODE 255: Performed by: OBSTETRICS & GYNECOLOGY

## 2018-01-10 RX ORDER — GADOBUTROL 604.72 MG/ML
9 INJECTION INTRAVENOUS
Status: COMPLETED | OUTPATIENT
Start: 2018-01-10 | End: 2018-01-10

## 2018-01-10 RX ADMIN — GADOBUTROL 9 ML: 604.72 INJECTION INTRAVENOUS at 10:01

## 2018-01-11 RX ORDER — NAPROXEN SODIUM 550 MG/1
550 TABLET ORAL
Qty: 30 TABLET | Refills: 12 | Status: SHIPPED | OUTPATIENT
Start: 2018-01-11 | End: 2018-01-12

## 2018-01-11 NOTE — PROGRESS NOTES
01/10/17 MRI  MR pelvis without intravenous contrast:   Technique: T2-weighted images with fat saturation were obtained of the pelvis in sagittal and coronal planes. T2-weighted axial images were also obtained as well as coronal T1-weighted images and Haste multi-slice images. No intravenous contrast was administered.  Comparison: None  Results:  There is a small linear, enhancing collection (6.3 x 0.6 x 0.6 cm) which extends from the skin surface of the perineum posterior to the right labia proximally to the rectal wall. Questionable communication with the lumen of the rectum, with enhancement of the anterior rectal wall.  The uterus is normal appearance. No evidence of endometrial thickening or focal uterine masses. Bilateral ovaries are within normal limits and contain normal appearing follicles. No significant volume of free fluid in the pelvis.  Osseous structures have normal marrow signal characteristics.      Impression     Small anorectal abscess with questionable fistulization which drains to the perineum posterior to th right labia.       PT AND  WERE HERE FOR PRE OP FOR PERSISTENT DRAINING AREA LATERAL TO R LABIA.  MRI SHOWS POSSIBLE FISTULAE.    ROS:  GENERAL: No fever, chills, fatigability or weight loss.  VULVAR: No pain, no lesions and no itching.  VAGINAL: No relaxation, no itching, no discharge, no abnormal bleeding and no lesions.  ABDOMEN: No abdominal pain. Denies nausea. Denies vomiting. No diarrhea. No constipation  BREAST: Denies pain. No lumps. No discharge.  URINARY: No incontinence, no nocturia, no frequency and no dysuria.  CARDIOVASCULAR: No chest pain. No shortness of breath. No leg cramps.  NEUROLOGICAL: No headaches. No vision changes.  The remainder of the review of systems was negative.    PE:  General Appearance: overweight And Well developed. Well nourished. In no acute distress.  Vulva: Lesions: STILL DRAINING. SKIN DENUDED. NO INFECTION      PROCEDURES:    PLAN:      DIAGNOSIS:  1. Inclusion cyst of vulva    2. Rectal fistula        MEDICATIONS & ORDERS:  Orders Placed This Encounter    Ambulatory referral to Colorectal Surgery    naproxen sodium (ANAPROX) 550 MG tablet       FOLLOW-UP: With [facility]

## 2018-01-12 ENCOUNTER — OFFICE VISIT (OUTPATIENT)
Dept: SURGERY | Facility: CLINIC | Age: 33
End: 2018-01-12
Payer: COMMERCIAL

## 2018-01-12 VITALS
BODY MASS INDEX: 35.97 KG/M2 | HEART RATE: 78 BPM | SYSTOLIC BLOOD PRESSURE: 137 MMHG | WEIGHT: 196.63 LBS | DIASTOLIC BLOOD PRESSURE: 71 MMHG

## 2018-01-12 DIAGNOSIS — K60.3 PERIANAL FISTULA: Primary | ICD-10-CM

## 2018-01-12 PROCEDURE — 99204 OFFICE O/P NEW MOD 45 MIN: CPT | Mod: S$GLB,,, | Performed by: COLON & RECTAL SURGERY

## 2018-01-12 PROCEDURE — 99999 PR PBB SHADOW E&M-EST. PATIENT-LVL III: CPT | Mod: PBBFAC,,, | Performed by: COLON & RECTAL SURGERY

## 2018-01-12 NOTE — PROGRESS NOTES
[facility] Office Visit History and Physical    Referring Md:   Aaareferral Self  No address on file    SUBJECTIVE:     Chief Complaint: anal fistula    History of Present Illness:  Patient is a 32 y.o. female presents with anal fistula. The patient is a new patient to this practice.   Course is as follows:  11/17/2017: vaginal delivery.  At that time, Periurethral and posterior second degree vaginal laceration repaired with 2-0 vicryl.   Inclusion cyst removed on right buttock. Repaired with 2-0 vicryl mattress style suture.   12/2017:  Seen by GYN here for inclusion cyst of the right labia.   MRI done that showed small anorectal abscess with questionable fistulization which drains to the perineum posterior to the right labia.    She has had ongoing drainage over the past 4-6 weeks.  The drainage is intermittent but she does have to wear a small gauze over the area.  She changed his gauze 4-5 times per day.  It has a mixture of blood and pus.  No obvious stool that is draining from the area.  This causes her to have irritation of the vulva on the right side as well as the perineum.    She is breast-feeding as well as formula feeding.  .  No family history of colorectal cancer.  No prior colonoscopy      Review of patient's allergies indicates:   Allergen Reactions    Motrin [ibuprofen]        Past Medical History:   Diagnosis Date    Short cervix --- VAGINAL PROGESTERONE 7/20/2017     No past surgical history on file.  Family History   Problem Relation Age of Onset    Breast cancer Neg Hx     Colon cancer Neg Hx     Ovarian cancer Neg Hx      Social History   Substance Use Topics    Smoking status: Never Smoker    Smokeless tobacco: Never Used    Alcohol use No        Review of Systems:  Review of Systems   Constitutional: Negative for chills, diaphoresis, fever, malaise/fatigue and weight loss.   HENT: Negative for congestion.    Respiratory: Negative for shortness of breath.    Cardiovascular: Negative for chest  pain and leg swelling.   Gastrointestinal: Negative for abdominal pain, blood in stool, constipation, diarrhea, nausea and vomiting.   Genitourinary: Negative for dysuria.   Musculoskeletal: Negative for back pain and myalgias.   Skin: Negative for rash.   Neurological: Negative for dizziness and weakness.   Endo/Heme/Allergies: Does not bruise/bleed easily.   Psychiatric/Behavioral: Negative for depression.       OBJECTIVE:     Vital Signs (Most Recent)  /71 (BP Location: Left arm, Patient Position: Sitting, BP Method: Medium (Automatic))   Pulse 78   Wt 89.2 kg (196 lb 10.4 oz)   BMI 35.97 kg/m²     Physical Exam:  General:  female in no distress   Neuro: alert and oriented x 4.  Moves all extremities.     HEENT: no icterus.  Trachea midline  Respiratory: respirations are even and unlabored  Cardiac: regular rate  Abdomen: Soft, nontender, no masses  Extremities: Warm dry and intact  Skin: no rashes  Anorectal: 3 small external openings seen on the right posterior perineum.  These were probed with a fistula probed and tracked towards the anal canal.  Digital exam was performed.  She has a small irregularity felt at the dentate line anteriorly.  Otherwise normal    Labs: none    Imaging: MRI carefully reviewed and demonstrates a fistulous tract with external opening near the right labia and tracking towards the anal canal      ASSESSMENT/PLAN:     Diagnoses and all orders for this visit:    Perianal fistula  -     Case Request Operating Room: hawk INTERIANO lithotomy        32-year-old woman with anterior perianal fistula with external opening near the right vulva and internal opening likely in the anterior midline.  This was probed today in clinic but she could not tolerate any further exam.  We discussed the need for exam under anesthesia with partial fistulotomy and likely seton placement.  She understands that this would be at least a 2 stage procedure.  The first stage be dedicated towards  draining any abscess, identifying the tract of the fistula, determining how much of the anal musculature was involved, and placing a seton.  The second stage would be dedicated towards attempts at fistula repair.  She had multiple questions today were answered in detail.  She can be in the lithotomy position as her fistula is anterior.    KENAN Benson MD  Staff Surgeon  Colon & Rectal Surgery

## 2018-01-25 ENCOUNTER — HOSPITAL ENCOUNTER (OUTPATIENT)
Facility: HOSPITAL | Age: 33
Discharge: HOME | End: 2018-01-25
Attending: COLON & RECTAL SURGERY | Admitting: COLON & RECTAL SURGERY
Payer: COMMERCIAL

## 2018-01-25 ENCOUNTER — SURGERY (OUTPATIENT)
Age: 33
End: 2018-01-25

## 2018-01-25 ENCOUNTER — ANESTHESIA EVENT (OUTPATIENT)
Dept: SURGERY | Facility: HOSPITAL | Age: 33
End: 2018-01-25
Payer: COMMERCIAL

## 2018-01-25 ENCOUNTER — ANESTHESIA (OUTPATIENT)
Dept: SURGERY | Facility: HOSPITAL | Age: 33
End: 2018-01-25
Payer: COMMERCIAL

## 2018-01-25 VITALS
WEIGHT: 196 LBS | SYSTOLIC BLOOD PRESSURE: 115 MMHG | HEIGHT: 62 IN | HEART RATE: 49 BPM | DIASTOLIC BLOOD PRESSURE: 59 MMHG | TEMPERATURE: 98 F | RESPIRATION RATE: 17 BRPM | OXYGEN SATURATION: 100 % | BODY MASS INDEX: 36.07 KG/M2

## 2018-01-25 DIAGNOSIS — K60.3 ANAL FISTULA: Primary | ICD-10-CM

## 2018-01-25 PROBLEM — K60.30 ANAL FISTULA: Status: ACTIVE | Noted: 2018-01-25

## 2018-01-25 LAB
B-HCG UR QL: NEGATIVE
CTP QC/QA: YES

## 2018-01-25 PROCEDURE — D9220A PRA ANESTHESIA: Mod: ANES,,, | Performed by: ANESTHESIOLOGY

## 2018-01-25 PROCEDURE — 36000706: Performed by: COLON & RECTAL SURGERY

## 2018-01-25 PROCEDURE — 27200651 HC AIRWAY, LMA: Performed by: NURSE ANESTHETIST, CERTIFIED REGISTERED

## 2018-01-25 PROCEDURE — 25000003 PHARM REV CODE 250: Performed by: COLON & RECTAL SURGERY

## 2018-01-25 PROCEDURE — 25000003 PHARM REV CODE 250: Performed by: STUDENT IN AN ORGANIZED HEALTH CARE EDUCATION/TRAINING PROGRAM

## 2018-01-25 PROCEDURE — 63600175 PHARM REV CODE 636 W HCPCS: Performed by: COLON & RECTAL SURGERY

## 2018-01-25 PROCEDURE — 71000033 HC RECOVERY, INTIAL HOUR: Performed by: COLON & RECTAL SURGERY

## 2018-01-25 PROCEDURE — 71000039 HC RECOVERY, EACH ADD'L HOUR: Performed by: COLON & RECTAL SURGERY

## 2018-01-25 PROCEDURE — 88305 TISSUE EXAM BY PATHOLOGIST: CPT | Mod: 26,,, | Performed by: PATHOLOGY

## 2018-01-25 PROCEDURE — 71000016 HC POSTOP RECOV ADDL HR: Performed by: COLON & RECTAL SURGERY

## 2018-01-25 PROCEDURE — C9290 INJ, BUPIVACAINE LIPOSOME: HCPCS | Performed by: COLON & RECTAL SURGERY

## 2018-01-25 PROCEDURE — 71000015 HC POSTOP RECOV 1ST HR: Performed by: COLON & RECTAL SURGERY

## 2018-01-25 PROCEDURE — 37000008 HC ANESTHESIA 1ST 15 MINUTES: Performed by: COLON & RECTAL SURGERY

## 2018-01-25 PROCEDURE — 36000707: Performed by: COLON & RECTAL SURGERY

## 2018-01-25 PROCEDURE — 37000009 HC ANESTHESIA EA ADD 15 MINS: Performed by: COLON & RECTAL SURGERY

## 2018-01-25 PROCEDURE — 46280 REMOVE ANAL FIST COMPLEX: CPT | Mod: ,,, | Performed by: COLON & RECTAL SURGERY

## 2018-01-25 PROCEDURE — 88305 TISSUE EXAM BY PATHOLOGIST: CPT | Performed by: PATHOLOGY

## 2018-01-25 PROCEDURE — 81025 URINE PREGNANCY TEST: CPT | Performed by: COLON & RECTAL SURGERY

## 2018-01-25 PROCEDURE — 63600175 PHARM REV CODE 636 W HCPCS: Performed by: NURSE ANESTHETIST, CERTIFIED REGISTERED

## 2018-01-25 PROCEDURE — D9220A PRA ANESTHESIA: Mod: CRNA,,, | Performed by: NURSE ANESTHETIST, CERTIFIED REGISTERED

## 2018-01-25 RX ORDER — LIDOCAINE HYDROCHLORIDE 10 MG/ML
1 INJECTION, SOLUTION EPIDURAL; INFILTRATION; INTRACAUDAL; PERINEURAL ONCE
Status: COMPLETED | OUTPATIENT
Start: 2018-01-25 | End: 2018-01-25

## 2018-01-25 RX ORDER — FENTANYL CITRATE 50 UG/ML
25 INJECTION, SOLUTION INTRAMUSCULAR; INTRAVENOUS EVERY 5 MIN PRN
Status: DISCONTINUED | OUTPATIENT
Start: 2018-01-25 | End: 2018-01-25 | Stop reason: HOSPADM

## 2018-01-25 RX ORDER — FENTANYL CITRATE 50 UG/ML
INJECTION, SOLUTION INTRAMUSCULAR; INTRAVENOUS
Status: DISCONTINUED | OUTPATIENT
Start: 2018-01-25 | End: 2018-01-25

## 2018-01-25 RX ORDER — SODIUM CHLORIDE 9 MG/ML
INJECTION, SOLUTION INTRAVENOUS CONTINUOUS
Status: DISCONTINUED | OUTPATIENT
Start: 2018-01-25 | End: 2018-01-25 | Stop reason: HOSPADM

## 2018-01-25 RX ORDER — OXYCODONE AND ACETAMINOPHEN 5; 325 MG/1; MG/1
1 TABLET ORAL ONCE
Status: COMPLETED | OUTPATIENT
Start: 2018-01-25 | End: 2018-01-25

## 2018-01-25 RX ORDER — OXYCODONE AND ACETAMINOPHEN 5; 325 MG/1; MG/1
1 TABLET ORAL EVERY 6 HOURS PRN
Qty: 30 TABLET | Refills: 0 | Status: SHIPPED | OUTPATIENT
Start: 2018-01-25 | End: 2018-02-02 | Stop reason: SDUPTHER

## 2018-01-25 RX ORDER — PROPOFOL 10 MG/ML
VIAL (ML) INTRAVENOUS
Status: DISCONTINUED | OUTPATIENT
Start: 2018-01-25 | End: 2018-01-25

## 2018-01-25 RX ORDER — MIDAZOLAM HYDROCHLORIDE 1 MG/ML
INJECTION, SOLUTION INTRAMUSCULAR; INTRAVENOUS
Status: DISCONTINUED | OUTPATIENT
Start: 2018-01-25 | End: 2018-01-25

## 2018-01-25 RX ORDER — SODIUM CHLORIDE 9 MG/ML
INJECTION, SOLUTION INTRAVENOUS CONTINUOUS
Status: DISCONTINUED | OUTPATIENT
Start: 2018-01-25 | End: 2018-01-25

## 2018-01-25 RX ORDER — LIDOCAINE HCL/PF 100 MG/5ML
SYRINGE (ML) INTRAVENOUS
Status: DISCONTINUED | OUTPATIENT
Start: 2018-01-25 | End: 2018-01-25

## 2018-01-25 RX ORDER — CEFAZOLIN SODIUM 1 G/3ML
INJECTION, POWDER, FOR SOLUTION INTRAMUSCULAR; INTRAVENOUS
Status: DISCONTINUED | OUTPATIENT
Start: 2018-01-25 | End: 2018-01-25

## 2018-01-25 RX ORDER — SODIUM CHLORIDE 0.9 % (FLUSH) 0.9 %
3 SYRINGE (ML) INJECTION
Status: DISCONTINUED | OUTPATIENT
Start: 2018-01-25 | End: 2018-01-25 | Stop reason: HOSPADM

## 2018-01-25 RX ORDER — ONDANSETRON 2 MG/ML
4 INJECTION INTRAMUSCULAR; INTRAVENOUS EVERY 12 HOURS PRN
Status: DISCONTINUED | OUTPATIENT
Start: 2018-01-25 | End: 2018-01-25 | Stop reason: HOSPADM

## 2018-01-25 RX ORDER — OXYCODONE AND ACETAMINOPHEN 5; 325 MG/1; MG/1
TABLET ORAL
Status: DISCONTINUED
Start: 2018-01-25 | End: 2018-01-25 | Stop reason: HOSPADM

## 2018-01-25 RX ORDER — BUPIVACAINE HYDROCHLORIDE 2.5 MG/ML
INJECTION, SOLUTION EPIDURAL; INFILTRATION; INTRACAUDAL
Status: DISCONTINUED | OUTPATIENT
Start: 2018-01-25 | End: 2018-01-25 | Stop reason: HOSPADM

## 2018-01-25 RX ORDER — HYDROCODONE BITARTRATE AND ACETAMINOPHEN 5; 325 MG/1; MG/1
1 TABLET ORAL EVERY 4 HOURS PRN
Status: DISCONTINUED | OUTPATIENT
Start: 2018-01-25 | End: 2018-01-25 | Stop reason: HOSPADM

## 2018-01-25 RX ADMIN — OXYCODONE HYDROCHLORIDE AND ACETAMINOPHEN 1 TABLET: 5; 325 TABLET ORAL at 02:01

## 2018-01-25 RX ADMIN — LIDOCAINE HYDROCHLORIDE 0.2 MG: 10 INJECTION, SOLUTION EPIDURAL; INFILTRATION; INTRACAUDAL; PERINEURAL at 12:01

## 2018-01-25 RX ADMIN — BUPIVACAINE HYDROCHLORIDE 30 ML: 2.5 INJECTION, SOLUTION EPIDURAL; INFILTRATION; INTRACAUDAL; PERINEURAL at 01:01

## 2018-01-25 RX ADMIN — PROPOFOL 150 MG: 10 INJECTION, EMULSION INTRAVENOUS at 12:01

## 2018-01-25 RX ADMIN — FENTANYL CITRATE 50 MCG: 50 INJECTION, SOLUTION INTRAMUSCULAR; INTRAVENOUS at 01:01

## 2018-01-25 RX ADMIN — FENTANYL CITRATE 50 MCG: 50 INJECTION, SOLUTION INTRAMUSCULAR; INTRAVENOUS at 12:01

## 2018-01-25 RX ADMIN — SODIUM CHLORIDE: 0.9 INJECTION, SOLUTION INTRAVENOUS at 12:01

## 2018-01-25 RX ADMIN — BUPIVACAINE 20 ML: 13.3 INJECTION, SUSPENSION, LIPOSOMAL INFILTRATION at 01:01

## 2018-01-25 RX ADMIN — SODIUM CHLORIDE: 0.9 INJECTION, SOLUTION INTRAVENOUS at 01:01

## 2018-01-25 RX ADMIN — CEFAZOLIN 2 G: 330 INJECTION, POWDER, FOR SOLUTION INTRAMUSCULAR; INTRAVENOUS at 01:01

## 2018-01-25 RX ADMIN — LIDOCAINE HYDROCHLORIDE 75 MG: 20 INJECTION, SOLUTION INTRAVENOUS at 12:01

## 2018-01-25 RX ADMIN — MIDAZOLAM HYDROCHLORIDE 2 MG: 1 INJECTION, SOLUTION INTRAMUSCULAR; INTRAVENOUS at 12:01

## 2018-01-25 RX ADMIN — PROPOFOL 50 MG: 10 INJECTION, EMULSION INTRAVENOUS at 01:01

## 2018-01-25 NOTE — TRANSFER OF CARE
"Anesthesia Transfer of Care Note    Patient: Yvette Reyes    Procedure(s) Performed: Procedure(s) (LRB):  FISTULOTOMY, seton, lithotomy (N/A)    Patient location: PACU    Anesthesia Type: general    Transport from OR: Transported from OR on 6-10 L/min O2 by face mask with adequate spontaneous ventilation    Post pain: adequate analgesia    Post assessment: no apparent anesthetic complications and tolerated procedure well    Post vital signs: stable    Level of consciousness: awake, alert and oriented    Nausea/Vomiting: no nausea/vomiting    Complications: none    Transfer of care protocol was followed      Last vitals:   Visit Vitals  /69 (BP Location: Right arm, Patient Position: Lying)   Pulse 84   Temp 36.5 °C (97.7 °F) (Temporal)   Resp 20   Ht 5' 2" (1.575 m)   Wt 88.9 kg (196 lb)   LMP 01/24/2018   SpO2 100%   Breastfeeding? Yes   BMI 35.85 kg/m²     "

## 2018-01-25 NOTE — ANESTHESIA PREPROCEDURE EVALUATION
01/25/2018  Yvette Reyes is a 32 y.o., female.    Anesthesia Evaluation    I have reviewed the Patient Summary Reports.    I have reviewed the Nursing Notes.   I have reviewed the Medications.     Review of Systems  Anesthesia Hx:  No problems with previous Anesthesia  History of prior surgery of interest to airway management or planning: Denies Family Hx of Anesthesia complications.   Denies Personal Hx of Anesthesia complications.   Social:  Non-Smoker    Hematology/Oncology:  Hematology Normal   Oncology Normal     EENT/Dental:EENT/Dental Normal   Cardiovascular:  Cardiovascular Normal     Pulmonary:  Pulmonary Normal    Renal/:  Renal/ Normal     Hepatic/GI:  Hepatic/GI Normal    Musculoskeletal:  Musculoskeletal Normal    Neurological:  Neurology Normal    Endocrine:  Endocrine Normal    Psych:  Psychiatric Normal           Physical Exam  General:  Well nourished, Obesity    Airway/Jaw/Neck:  Airway Findings: Mouth Opening: Normal Tongue: Normal  General Airway Assessment: Adult  Mallampati: I  TM Distance: Normal, at least 6 cm  Jaw/Neck Findings:  Neck ROM: Normal ROM      Dental:  Dental Findings: In tact   Chest/Lungs:  Chest/Lungs Findings: Clear to auscultation, Normal Respiratory Rate     Heart/Vascular:  Heart Findings: Rate: Normal  Rhythm: Regular Rhythm  Sounds: Normal        Mental Status:  Mental Status Findings:  Cooperative, Alert and Oriented         Anesthesia Plan  Type of Anesthesia, risks & benefits discussed:  Anesthesia Type:  general  Patient's Preference:   Intra-op Monitoring Plan:   Intra-op Monitoring Plan Comments:   Post Op Pain Control Plan:   Post Op Pain Control Plan Comments:   Induction:   IV  Beta Blocker:  Patient is not currently on a Beta-Blocker (No further documentation required).       Informed Consent: Patient understands risks and agrees with Anesthesia  plan.  Questions answered. Anesthesia consent signed with patient.  ASA Score: 2     Day of Surgery Review of History & Physical:    H&P update referred to the surgeon.         Ready For Surgery From Anesthesia Perspective.

## 2018-01-25 NOTE — DISCHARGE INSTRUCTIONS
You had a fistulotomy today with seton placement.  I found a fistula that tracked from the anterior side of the anal canal up to the skin that was ulcerated and then up to the side of the vagina.  The fistula did not connect to the vagina but was very close.  The fistula was excised up until it was close to the muscle of the anal canal and a seton was placed in that part.    We will plan to allow the wound on the side that tracked up to the vagina heal up and then we can fix the fistula.    You have a 2x3cm opening between the anus and the vagina that was infected tissue.  A gauze is packed in there.  This gauze should be removed tomorrow.  To remove it, get in the shower and let the whole area get wet.  The gauze should be removed.  The seton (blue rubber band) should stay.  You do not need to re-pack the wound.   Expect some drainage over the next few days to weeks. This is normal and expected.   You can shower and bathe.    No dietary restrictions.         Anesthesia: General Anesthesia     You are watched continuously during your procedure by your anesthesia provider.     Youre due to have surgery. During surgery, youll be given medicine called anesthesia or anesthetic. This will keep you comfortable and pain-free. Your anesthesia provider will use general anesthesia.  What is general anesthesia?  General anesthesia puts you into a state like deep sleep. It goes into the bloodstream (IV anesthetics), into the lungs (gas anesthetics), or both. You feel nothing during the procedure. You will not remember it. During the procedure, the anesthesia provider monitors you continuously. He or she checks your heart rate and rhythm, blood pressure, breathing, and blood oxygen.  · IV anesthetics. IV anesthetics are given through an IV line in your arm. Theyre often given first. This is so you are asleep before a gas anesthetic is started. Some kinds of IV anesthetics relieve pain. Others relax you. Your doctor will  decide which kind is best in your case.  · Gas anesthetics. Gas anesthetics are breathed into the lungs. They are often used to keep you asleep. They can be given through a facemask or a tube placed in your larynx or trachea (breathing tube).  ¨ If you have a facemask, your anesthesia provider will most likely place it over your nose and mouth while youre still awake. Youll breathe oxygen through the mask as your IV anesthetic is started. Gas anesthetic may be added through the mask.  ¨ If you have a tube in the larynx or trachea, it will be inserted into your throat after youre asleep.  Anesthesia tools and medicines  You will likely have:  · IV anesthetics. These are put into an IV line into your bloodstream.  · Gas anesthetics. You breathe these anesthetics into your lungs, where they pass into your bloodstream.  · Pulse oximeter. This is a small clip that is attached to the end of your finger. This measures your blood oxygen level.  · Electrocardiography leads (electrodes). These are small sticky pads that are placed on your chest. They record your heart rate and rhythm.  · Blood pressure cuff. This reads your blood pressure.  Risks and possible complications  General anesthesia has some risks. These include:  · Breathing problems  · Nausea and vomiting  · Sore throat or hoarseness (usually temporary)  · Allergic reaction to the anesthetic  · Irregular heartbeat (rare)  · Cardiac arrest (rare)   Anesthesia safety  · Follow all instructions you are given for how long not to eat or drink before your procedure.  · Be sure your doctor knows what medicines and drugs you take. This includes over-the-counter medicines, herbs, supplements, alcohol or other drugs. You will be asked when those were last taken.  · Have an adult family member or friend drive you home after the procedure.  · For the first 24 hours after your surgery:  ¨ Do not drive or use heavy equipment.  ¨ Do not make important decisions or sign legal  documents. If important decisions or signing legal documents is necessary during the first 24 hours after surgery, have a trusted family member or spouse act on your behalf.  ¨ Avoid alcohol.  ¨ Have a responsible adult stay with you. He or she can watch for problems and help keep you safe.  Date Last Reviewed: 12/1/2016  © 1703-2315 [employer]. [address] [place] [address]. All rights reserved. This information is not intended as a substitute for professional medical care. Always follow your healthcare professional's instructions.        For pain, please take percocet for severe pain.  If the pain is not that severe, you can take tylenol (acetaminophen) or Aleve.  You should take Miralax one capful at night while taking percocet to prevent constipation.       I will see you in clinic in 2 weeks.     KENAN Benson MD  Staff Surgeon  Colon & Rectal Surgery

## 2018-01-25 NOTE — BRIEF OP NOTE
[facility]  Brief Operative Note     SUMMARY     Surgery Date: 1/25/2018     Surgeon(s) and Role:     * Skip Benson MD - Primary    Assisting Surgeon: None    Pre-op Diagnosis:  Perianal fistula [K60.3]    Post-op Diagnosis:  Post-Op Diagnosis Codes:     * Perianal fistula [K60.3]    Procedure(s) (LRB):  FISTULOTOMY, seton, lithotomy (N/A)    Anesthesia: General/MAC    Description of the findings of the procedure: Anterior midline anal fistula that tracks anterolateral and also posteriorly towards vagina    Findings/Key Components: EUA. Excision of fistula tract, cavity, and overlying skin. 1 seton placed    Estimated Blood Loss: 10cc         Specimens:   Specimen (12h ago through future)    Start     Ordered    01/25/18 1334  Specimen to Pathology - Surgery  Once     Comments:  1. Anal fistula-perm2. Fistula tract-perm      01/25/18 1352          Discharge Note    SUMMARY     Admit Date: 1/25/2018    Discharge Date and Time:  01/25/2018 2:05 PM    Hospital Course (synopsis of major diagnoses, care, treatment, and services provided during the course of the hospital stay): Admitted and underwent exam under anesthesia, fistulotomy, and seton placement for anal fistula. She tolerated the procedure well without apparent complication. Discharged home with clinic appointment in 2 weeks.     Final Diagnosis: Post-Op Diagnosis Codes:     * Perianal fistula [K60.3]    Disposition: Home or Self Care    Follow Up/Patient Instructions:     Medications:  Reconciled Home Medications:   Current Discharge Medication List      START taking these medications    Details   oxyCODONE-acetaminophen (PERCOCET) 5-325 mg per tablet Take 1 tablet by mouth every 6 (six) hours as needed for Pain.  Qty: 30 tablet, Refills: 0         CONTINUE these medications which have NOT CHANGED    Details   PNV 11-iron fum-folic acid-om3 (VIVA DHA) 28-1-200 mg Cap Take 1 capsule by mouth once daily.  Qty: 30 each, Refills: 12              Discharge Procedure Orders  Diet Adult Regular     Activity as tolerated     Notify your health care provider if you experience any of the following:  redness, tenderness, or signs of infection (pain, swelling, redness, odor or green/yellow discharge around incision site)     Notify your health care provider if you experience any of the following:  severe uncontrolled pain     Notify your health care provider if you experience any of the following:  persistent nausea and vomiting or diarrhea     Notify your health care provider if you experience any of the following:  temperature >100.4     Change dressing (specify)   Order Comments: Dressing change: place clean, dry gauze to wound as needed to keep area dry.  Leave blue seton in place.       Follow-up Information     Skip Benson MD In 2 weeks.    Specialty:  Colon and Rectal Surgery  Contact information:  [address] [place] [id number] [phone]

## 2018-01-26 NOTE — OP NOTE
DATE OF PROCEDURE:  01/25/2018.    PREOPERATIVE DIAGNOSIS:  Perianal fistula.    POSTOPERATIVE DIAGNOSIS:  Transsphincteric perianal fistula.    PROCEDURES PERFORMED:  1.  Partial fistulectomy.  2.  Seton placement.    ATTENDING SURGEON:  Skip Benson M.D.    RESIDENT:  Shiva Rivas MD    ANESTHESIA:  General.    ESTIMATED BLOOD LOSS:  25 mL.    COMPLICATIONS:  None apparent.    DISPOSITION:  PACU, then home.    SPECIMENS:  1.  Perianal fistula.  2.  Fistula tract.    FINDINGS:  Transsphincteric fistula was found with the internal opening at the   dentate line anteriorly.  This tracked up to the skin on the right anterolateral   side.  At the level of the skin, this then dived back down and then tracked up   to the right sidewall of the vagina with no obvious communication to the vagina.    This may have been related to prior attempted drainage with the thought that   it was a Bartholin cyst in the past.   The fistula tract was excised from the level of the   sidewall of the vagina down to the anal muscle.  At this level, it did involve a   significant portion of the external anal sphincter.  Therefore, primary   fistulotomy was not performed and a seton was placed.  She will likely need   advancement flap once the defect from partial fistulotomy heals.  This should be done in prone position since   The internal opening is anterior.    INDICATIONS:  Ms. Reyes is a 32-year-old woman who had a complex perianal   fistula that formed following vaginal delivery where she was thought to have a   Bartholin gland cyst and an incision and drainage was performed at the time of   her delivery two months ago.  This area never healed.  Eventually, an MRI was   performed demonstrating that it was more consistent with a perianal abscess that   resulted in a complex transsphincteric perianal fistula.  She was therefore   referred to my office for evaluation and treatment.  We discussed the first step   would be an exam  under anesthesia to identify the fistula tract, determine how   much of the external anal sphincter was involved, and excise any unhealthy or   clearly infected tissue.  She demonstrated understanding and wished to proceed   with surgery.    DESCRIPTION OF PROCEDURE:  After informed consent was reviewed, the patient was   taken to the OR and transferred to the OR table.  She was placed under general   anesthesia.  Ancef was given for preoperative antibiotics.  She was then placed   into the lithotomy position.  The perianal skin was prepped and draped in the   usual sterile fashion and a timeout was performed.  Digital exam was initially   performed.  No obvious internal opening was palpated.  The Hill-Carbajal   anoscope was then inserted.  The majority of the anal canal appeared normal.    She had a cleft seen anteriorly.  No obvious internal opening of the fistula   could be identified.  Next, the external opening on the right anterolateral   aspect of the anal canal was probed.  This tracked towards the anal canal, but   the fistula tract was tortuous.  With the Hill-Carbajal anoscope in place, the   fistula tract was injected with hydrogen peroxide and bubbles were seen from the  dentate line anteriorly in the midline.  The external opening of the fistula had   multiple small punctate openings that all coalesced into one fistula tract.    All of the external openings were circumferentially excised to track down to one   fistula tract.  Once this one fistula tract was identified, it was then   connected to the internal opening with the fistula probe.  The fistula appeared   to come up to the level of the skin and then dive back down and head up towards   the right side of the vagina.  The fistula tract heading up to the vagina was   then fully excised with fistulectomy.  Fistulectomy continued down to the site   of the external anal sphincter muscle.  The fistula involved a significant   portion of the external  anal sphincter and was not divided.  A 0 silk tie was   then placed on the fistula probe and pulled through the tract.  This was then   used to guide a blue vessel loop that was overlapped to form a non-cutting   seton.  Hemostasis was achieved in the fistulectomy site.  A 4 x 4 gauze was   then partially placed into the fistulectomy site for packing and fluff dressings   were applied.  Prior to applying dressings, 60 mL of 0.25% Marcaine mixed with   Exparel were injected as a perianal block.  She tolerated the procedure well.    There were no apparent intraoperative complications.  All sponge, needle, and   instrument counts were correct x2.  She was taken to PACU in stable condition.      DAVON  dd: 01/25/2018 14:22:34 (CST)  td: 01/25/2018 18:41:54 (CST)  Doc ID   #[id number]  Job ID #[id number]    CC:

## 2018-01-28 ENCOUNTER — PATIENT MESSAGE (OUTPATIENT)
Dept: SURGERY | Facility: CLINIC | Age: 33
End: 2018-01-28

## 2018-02-02 RX ORDER — OXYCODONE AND ACETAMINOPHEN 5; 325 MG/1; MG/1
1 TABLET ORAL EVERY 6 HOURS PRN
Qty: 30 TABLET | Refills: 0 | Status: SHIPPED | OUTPATIENT
Start: 2018-02-02 | End: 2018-03-08

## 2018-02-07 NOTE — PROGRESS NOTES
"  CRS Office Post Operative Visit  Referring Md:   No referring provider defined for this encounter.    SUBJECTIVE:     Chief Complaint: followup    History of Present Illness:  Patient is a 32 y.o. female presents with followup from surgery.     Operation: Transsphincteric perianal fistula.    1/25/2018   PROCEDURES PERFORMED:  1.  Partial fistulectomy.  2.  Seton placement.    Pathology: none    She presented today for first follow-up.  She states she has had intermittent pain by the posterior vaginal wall.  Ongoing drainage.  She is anxious    Review of Systems:  Review of Systems   Constitutional: Negative for chills and fever.   Skin: Positive for rash.   Psychiatric/Behavioral: The patient is nervous/anxious.        OBJECTIVE:     Vital Signs (Most Recent)  /89   Pulse 88   Ht 5' 2.01" (1.575 m)   Wt 85.9 kg (189 lb 6 oz)   LMP 01/24/2018   Breastfeeding? Yes   BMI 34.63 kg/m²     Physical Exam:  General:  female in no distress   Respiratory: respirations are even and unlabored  Cardiac: regular rate  Abdomen: Soft, nontender, no masses  Extremities: Warm dry and intact  Anorectal: Seton in place in the right anterior position.  Granulation tissue is seen anteriorly towards this extending up towards the right posterior labia vagina.  No underlying fluid collection is palpated.  The granulation tissue and fistulectomy site appeared to be well healing.      ASSESSMENT/PLAN:     Diagnoses and all orders for this visit:    Anal fistula    Dysuria  -     URINALYSIS  -     CULTURE, URINE        32 y.o. female post op from partial fistulectomy with seton placement.   Overall, she is healing well.  Excellent granulation tissue seen towards the vagina.  We discussed the normal postoperative course.  I'll plan to see her back in 4 weeks.  At that time, we can discussed her nextsurgery    She will likely need advancement flap once the defect from partial fistulotomy heals.  This should be done in prone " position since the internal opening is anterior.    KENAN Benson MD  Staff Surgeon  Colon & Rectal Surgery

## 2018-02-08 ENCOUNTER — OFFICE VISIT (OUTPATIENT)
Dept: SURGERY | Facility: CLINIC | Age: 33
End: 2018-02-08
Payer: COMMERCIAL

## 2018-02-08 VITALS
DIASTOLIC BLOOD PRESSURE: 89 MMHG | HEART RATE: 88 BPM | WEIGHT: 189.38 LBS | SYSTOLIC BLOOD PRESSURE: 132 MMHG | BODY MASS INDEX: 34.85 KG/M2 | HEIGHT: 62 IN

## 2018-02-08 DIAGNOSIS — R30.0 DYSURIA: ICD-10-CM

## 2018-02-08 DIAGNOSIS — K60.3 ANAL FISTULA: Primary | ICD-10-CM

## 2018-02-08 LAB
BACTERIA #/AREA URNS AUTO: ABNORMAL /HPF
BILIRUB UR QL STRIP: NEGATIVE
CLARITY UR REFRACT.AUTO: ABNORMAL
COLOR UR AUTO: YELLOW
GLUCOSE UR QL STRIP: NEGATIVE
HGB UR QL STRIP: ABNORMAL
KETONES UR QL STRIP: NEGATIVE
LEUKOCYTE ESTERASE UR QL STRIP: ABNORMAL
MICROSCOPIC COMMENT: ABNORMAL
NITRITE UR QL STRIP: NEGATIVE
PH UR STRIP: 5 [PH] (ref 5–8)
PROT UR QL STRIP: NEGATIVE
RBC #/AREA URNS AUTO: 5 /HPF (ref 0–4)
SP GR UR STRIP: 1.01 (ref 1–1.03)
SQUAMOUS #/AREA URNS AUTO: 8 /HPF
URN SPEC COLLECT METH UR: ABNORMAL
UROBILINOGEN UR STRIP-ACNC: NEGATIVE EU/DL
WBC #/AREA URNS AUTO: 37 /HPF (ref 0–5)

## 2018-02-08 PROCEDURE — 87086 URINE CULTURE/COLONY COUNT: CPT

## 2018-02-08 PROCEDURE — 87088 URINE BACTERIA CULTURE: CPT

## 2018-02-08 PROCEDURE — 99213 OFFICE O/P EST LOW 20 MIN: CPT | Performed by: COLON & RECTAL SURGERY

## 2018-02-08 PROCEDURE — 99024 POSTOP FOLLOW-UP VISIT: CPT | Mod: S$GLB,,, | Performed by: COLON & RECTAL SURGERY

## 2018-02-08 PROCEDURE — 87077 CULTURE AEROBIC IDENTIFY: CPT

## 2018-02-08 PROCEDURE — 99999 PR PBB SHADOW E&M-EST. PATIENT-LVL III: CPT | Mod: PBBFAC,,, | Performed by: COLON & RECTAL SURGERY

## 2018-02-08 PROCEDURE — 87186 SC STD MICRODIL/AGAR DIL: CPT

## 2018-02-08 PROCEDURE — 81001 URINALYSIS AUTO W/SCOPE: CPT

## 2018-02-09 ENCOUNTER — PATIENT MESSAGE (OUTPATIENT)
Dept: SURGERY | Facility: CLINIC | Age: 33
End: 2018-02-09

## 2018-02-10 ENCOUNTER — PATIENT MESSAGE (OUTPATIENT)
Dept: SURGERY | Facility: CLINIC | Age: 33
End: 2018-02-10

## 2018-02-12 ENCOUNTER — TELEPHONE (OUTPATIENT)
Dept: SURGERY | Facility: CLINIC | Age: 33
End: 2018-02-12

## 2018-02-12 LAB — BACTERIA UR CULT: NORMAL

## 2018-02-12 RX ORDER — CIPROFLOXACIN 500 MG/1
500 TABLET ORAL EVERY 12 HOURS
Qty: 10 TABLET | Refills: 0 | Status: SHIPPED | OUTPATIENT
Start: 2018-02-12 | End: 2018-02-17

## 2018-02-12 NOTE — TELEPHONE ENCOUNTER
----- Message from Judah Gupta sent at 2/12/2018 10:16 AM CST -----  Contact: Pt:[phone]  Pt called and states she would like to speak with the nurse because she recently has surgery with . Pt states she thinks she needs an antibiotic called into her local pharmacy.

## 2018-02-12 NOTE — PROGRESS NOTES
Called pt to discuss urine cx results.  She is having dysuria and frequency.  Therefore, I have written for her to take cipro for 5 days to clear the infection.    KENAN Benson MD  Staff Surgeon  Colon & Rectal Surgery

## 2018-02-18 ENCOUNTER — PATIENT MESSAGE (OUTPATIENT)
Dept: SURGERY | Facility: CLINIC | Age: 33
End: 2018-02-18

## 2018-02-19 ENCOUNTER — TELEPHONE (OUTPATIENT)
Dept: SURGERY | Facility: CLINIC | Age: 33
End: 2018-02-19

## 2018-02-19 NOTE — TELEPHONE ENCOUNTER
----- Message from Skye Harper sent at 2/19/2018 11:57 AM CST -----  Contact: pt [phone]  Pt would like to discuss receiving a return to work note. Pt is asking for a call today

## 2018-02-19 NOTE — TELEPHONE ENCOUNTER
"Spoke with patient. Return to work letter emailed per patient request. States is having an intermittent  "polking" sensation when sitting/ standing/ urinating. Denies fever, urinary urgency/frequency. Instructed to call clinic if issues/symptoms develop before F/U clinic visit 3/8/18.  "

## 2018-03-07 NOTE — PROGRESS NOTES
"  CRS Office Visit  Referring Md:   No referring provider defined for this encounter.    SUBJECTIVE:     Chief Complaint: followup    History of Present Illness:  Patient is a 32 y.o. female presents with followup from surgery.     Operation: Transsphincteric perianal fistula.    1/25/2018   PROCEDURES PERFORMED:  1.  Partial fistulectomy.  2.  Seton placement.    Pathology: none    2/12: doing well.  Wound healing  3/8: Overall doing very well.  Still has continued drainage.  Pain is improved dramatically.  Feels the wound is healing excellently.  Is ready for her advancement flap    Review of Systems:  Review of Systems   Constitutional: Negative for chills and fever.   Skin: Positive for rash.   Psychiatric/Behavioral: The patient is nervous/anxious.        OBJECTIVE:     Vital Signs (Most Recent)  /87 (BP Location: Left arm, Patient Position: Sitting, BP Method: Large (Automatic))   Pulse 86   Ht 5' 2" (1.575 m)   Wt 86.4 kg (190 lb 7.6 oz)   BMI 34.84 kg/m²     Physical Exam:  General:  female in no distress   Respiratory: respirations are even and unlabored  Cardiac: regular rate  Abdomen: Soft, nontender, no masses  Extremities: Warm dry and intact  Anorectal: Granulation tissue seen on the right anterior side around the seton.  The seton appears dry.  No further undrained fluid collection.    ASSESSMENT/PLAN:     Yvette was seen today for follow-up.    Diagnoses and all orders for this visit:    Perianal fistula  -     Case Request Operating Room: [id number], prone        32 y.o. female post op from partial fistulectomy with seton placement.   Overall, she appears to be healing well.  We will plan for a endorectal advancement flap.  Today, we discussed the procedure in detail.  She understands that it has a proximal 60-70% success rate.  Her external tract would be debrided at the time of surgery.  All questions were answered.  She will need to be in the prone position since " the internal opening is anterior.    KENAN Benson MD  Staff Surgeon  Colon & Rectal Surgery

## 2018-03-08 ENCOUNTER — OFFICE VISIT (OUTPATIENT)
Dept: SURGERY | Facility: CLINIC | Age: 33
End: 2018-03-08
Payer: COMMERCIAL

## 2018-03-08 VITALS
SYSTOLIC BLOOD PRESSURE: 123 MMHG | BODY MASS INDEX: 35.06 KG/M2 | WEIGHT: 190.5 LBS | HEIGHT: 62 IN | DIASTOLIC BLOOD PRESSURE: 87 MMHG | HEART RATE: 86 BPM

## 2018-03-08 DIAGNOSIS — K60.3 PERIANAL FISTULA: Primary | ICD-10-CM

## 2018-03-08 PROCEDURE — 99999 PR PBB SHADOW E&M-EST. PATIENT-LVL IV: CPT | Mod: PBBFAC,,, | Performed by: COLON & RECTAL SURGERY

## 2018-03-08 PROCEDURE — 99024 POSTOP FOLLOW-UP VISIT: CPT | Mod: S$GLB,,, | Performed by: COLON & RECTAL SURGERY

## 2018-03-09 ENCOUNTER — ANESTHESIA EVENT (OUTPATIENT)
Dept: SURGERY | Facility: HOSPITAL | Age: 33
End: 2018-03-09
Payer: COMMERCIAL

## 2018-03-09 NOTE — ANESTHESIA PREPROCEDURE EVALUATION
03/09/2018  Yvette Reyes is a 32 y.o., female.    Pre-op Assessment    I have reviewed the Patient Summary Reports.     I have reviewed the Nursing Notes.   I have reviewed the Medications.     Review of Systems  Anesthesia Hx:  No problems with previous Anesthesia  History of prior surgery of interest to airway management or planning: Denies Family Hx of Anesthesia complications.   Denies Personal Hx of Anesthesia complications.   Social:  Non-Smoker    Hematology/Oncology:  Hematology Normal   Oncology Normal     EENT/Dental:EENT/Dental Normal   Cardiovascular:  Cardiovascular Normal     Pulmonary:  Pulmonary Normal    Renal/:  Renal/ Normal     Hepatic/GI:  Hepatic/GI Normal    Musculoskeletal:  Musculoskeletal Normal    Neurological:  Neurology Normal    Endocrine:  Endocrine Normal    Psych:  Psychiatric Normal           Physical Exam  General:  Well nourished, Obesity    Airway/Jaw/Neck:  Airway Findings: Mouth Opening: Normal Tongue: Normal  General Airway Assessment: Adult  Mallampati: I  TM Distance: Normal, at least 6 cm  Jaw/Neck Findings:  Neck ROM: Normal ROM      Dental:  Dental Findings: In tact   Chest/Lungs:  Chest/Lungs Findings: Clear to auscultation, Normal Respiratory Rate     Heart/Vascular:  Heart Findings: Rate: Normal  Rhythm: Regular Rhythm  Sounds: Normal        Mental Status:  Mental Status Findings:  Cooperative, Alert and Oriented         Anesthesia Plan  Type of Anesthesia, risks & benefits discussed:  Anesthesia Type:  general  Patient's Preference:   Intra-op Monitoring Plan: standard ASA monitors  Intra-op Monitoring Plan Comments:   Post Op Pain Control Plan: per primary service following discharge from PACU  Post Op Pain Control Plan Comments:   Induction:   IV  Beta Blocker:  Patient is not currently on a Beta-Blocker (No further documentation required).        Informed Consent: Patient understands risks and agrees with Anesthesia plan.  Questions answered. Anesthesia consent signed with patient.  ASA Score: 1     Day of Surgery Review of History & Physical:    H&P update referred to the surgeon.         Ready For Surgery From Anesthesia Perspective.

## 2018-03-12 ENCOUNTER — HOSPITAL ENCOUNTER (OUTPATIENT)
Facility: HOSPITAL | Age: 33
Discharge: HOME | End: 2018-03-12
Attending: COLON & RECTAL SURGERY | Admitting: COLON & RECTAL SURGERY
Payer: COMMERCIAL

## 2018-03-12 ENCOUNTER — SURGERY (OUTPATIENT)
Age: 33
End: 2018-03-12

## 2018-03-12 ENCOUNTER — ANESTHESIA (OUTPATIENT)
Dept: SURGERY | Facility: HOSPITAL | Age: 33
End: 2018-03-12
Payer: COMMERCIAL

## 2018-03-12 VITALS
WEIGHT: 190 LBS | BODY MASS INDEX: 34.96 KG/M2 | TEMPERATURE: 98 F | OXYGEN SATURATION: 100 % | HEIGHT: 62 IN | DIASTOLIC BLOOD PRESSURE: 62 MMHG | RESPIRATION RATE: 19 BRPM | HEART RATE: 60 BPM | SYSTOLIC BLOOD PRESSURE: 130 MMHG

## 2018-03-12 DIAGNOSIS — K60.3 ANAL FISTULA: ICD-10-CM

## 2018-03-12 LAB
B-HCG UR QL: NEGATIVE
CTP QC/QA: YES

## 2018-03-12 PROCEDURE — 36000707: Performed by: COLON & RECTAL SURGERY

## 2018-03-12 PROCEDURE — 46288 REPAIR ANAL FISTULA: CPT | Mod: 78,,, | Performed by: COLON & RECTAL SURGERY

## 2018-03-12 PROCEDURE — 11042 DBRDMT SUBQ TIS 1ST 20SQCM/<: CPT | Mod: 51,78,, | Performed by: COLON & RECTAL SURGERY

## 2018-03-12 PROCEDURE — D9220A PRA ANESTHESIA: Mod: ANES,,, | Performed by: ANESTHESIOLOGY

## 2018-03-12 PROCEDURE — 94761 N-INVAS EAR/PLS OXIMETRY MLT: CPT

## 2018-03-12 PROCEDURE — 63600175 PHARM REV CODE 636 W HCPCS: Performed by: ANESTHESIOLOGY

## 2018-03-12 PROCEDURE — 36000706: Performed by: COLON & RECTAL SURGERY

## 2018-03-12 PROCEDURE — 71000016 HC POSTOP RECOV ADDL HR: Performed by: COLON & RECTAL SURGERY

## 2018-03-12 PROCEDURE — 25000003 PHARM REV CODE 250: Performed by: NURSE ANESTHETIST, CERTIFIED REGISTERED

## 2018-03-12 PROCEDURE — 27201423 OPTIME MED/SURG SUP & DEVICES STERILE SUPPLY: Performed by: COLON & RECTAL SURGERY

## 2018-03-12 PROCEDURE — 25000003 PHARM REV CODE 250: Performed by: COLON & RECTAL SURGERY

## 2018-03-12 PROCEDURE — D9220A PRA ANESTHESIA: Mod: CRNA,,, | Performed by: NURSE ANESTHETIST, CERTIFIED REGISTERED

## 2018-03-12 PROCEDURE — 63600175 PHARM REV CODE 636 W HCPCS: Performed by: NURSE ANESTHETIST, CERTIFIED REGISTERED

## 2018-03-12 PROCEDURE — 71000039 HC RECOVERY, EACH ADD'L HOUR: Performed by: COLON & RECTAL SURGERY

## 2018-03-12 PROCEDURE — 25000003 PHARM REV CODE 250: Performed by: SURGERY

## 2018-03-12 PROCEDURE — 37000008 HC ANESTHESIA 1ST 15 MINUTES: Performed by: COLON & RECTAL SURGERY

## 2018-03-12 PROCEDURE — 71000033 HC RECOVERY, INTIAL HOUR: Performed by: COLON & RECTAL SURGERY

## 2018-03-12 PROCEDURE — C9290 INJ, BUPIVACAINE LIPOSOME: HCPCS | Performed by: COLON & RECTAL SURGERY

## 2018-03-12 PROCEDURE — 25000003 PHARM REV CODE 250: Performed by: NURSE PRACTITIONER

## 2018-03-12 PROCEDURE — 37000009 HC ANESTHESIA EA ADD 15 MINS: Performed by: COLON & RECTAL SURGERY

## 2018-03-12 PROCEDURE — 71000015 HC POSTOP RECOV 1ST HR: Performed by: COLON & RECTAL SURGERY

## 2018-03-12 PROCEDURE — 63600175 PHARM REV CODE 636 W HCPCS: Performed by: COLON & RECTAL SURGERY

## 2018-03-12 PROCEDURE — S0030 INJECTION, METRONIDAZOLE: HCPCS | Performed by: NURSE ANESTHETIST, CERTIFIED REGISTERED

## 2018-03-12 PROCEDURE — 81025 URINE PREGNANCY TEST: CPT | Performed by: COLON & RECTAL SURGERY

## 2018-03-12 PROCEDURE — 27000221 HC OXYGEN, UP TO 24 HOURS

## 2018-03-12 RX ORDER — NEOSTIGMINE METHYLSULFATE 1 MG/ML
INJECTION, SOLUTION INTRAVENOUS
Status: DISCONTINUED | OUTPATIENT
Start: 2018-03-12 | End: 2018-03-12

## 2018-03-12 RX ORDER — FENTANYL CITRATE 50 UG/ML
25 INJECTION, SOLUTION INTRAMUSCULAR; INTRAVENOUS EVERY 5 MIN PRN
Status: DISCONTINUED | OUTPATIENT
Start: 2018-03-12 | End: 2018-03-12 | Stop reason: HOSPADM

## 2018-03-12 RX ORDER — OXYCODONE HYDROCHLORIDE 5 MG/1
5 TABLET ORAL EVERY 4 HOURS PRN
Qty: 40 TABLET | Refills: 0 | Status: SHIPPED | OUTPATIENT
Start: 2018-03-12 | End: 2018-03-12

## 2018-03-12 RX ORDER — GLYCOPYRROLATE 0.2 MG/ML
INJECTION INTRAMUSCULAR; INTRAVENOUS
Status: DISCONTINUED | OUTPATIENT
Start: 2018-03-12 | End: 2018-03-12

## 2018-03-12 RX ORDER — SODIUM CHLORIDE 0.9 % (FLUSH) 0.9 %
3 SYRINGE (ML) INJECTION
Status: DISCONTINUED | OUTPATIENT
Start: 2018-03-12 | End: 2018-03-12 | Stop reason: HOSPADM

## 2018-03-12 RX ORDER — ONDANSETRON 2 MG/ML
INJECTION INTRAMUSCULAR; INTRAVENOUS
Status: DISCONTINUED | OUTPATIENT
Start: 2018-03-12 | End: 2018-03-12

## 2018-03-12 RX ORDER — BUPIVACAINE HYDROCHLORIDE 2.5 MG/ML
INJECTION, SOLUTION EPIDURAL; INFILTRATION; INTRACAUDAL
Status: DISCONTINUED | OUTPATIENT
Start: 2018-03-12 | End: 2018-03-12 | Stop reason: HOSPADM

## 2018-03-12 RX ORDER — OXYCODONE HYDROCHLORIDE 5 MG/1
5 TABLET ORAL EVERY 4 HOURS PRN
Status: DISCONTINUED | OUTPATIENT
Start: 2018-03-12 | End: 2018-03-12 | Stop reason: HOSPADM

## 2018-03-12 RX ORDER — LIDOCAINE HYDROCHLORIDE 10 MG/ML
1 INJECTION, SOLUTION EPIDURAL; INFILTRATION; INTRACAUDAL; PERINEURAL ONCE
Status: COMPLETED | OUTPATIENT
Start: 2018-03-12 | End: 2018-03-12

## 2018-03-12 RX ORDER — DEXTROMETHORPHAN HYDROBROMIDE, GUAIFENESIN 5; 100 MG/5ML; MG/5ML
650 LIQUID ORAL EVERY 8 HOURS
Refills: 0 | COMMUNITY
Start: 2018-03-12 | End: 2018-10-25

## 2018-03-12 RX ORDER — MUPIROCIN 20 MG/G
OINTMENT TOPICAL
Status: DISCONTINUED | OUTPATIENT
Start: 2018-03-12 | End: 2018-03-12 | Stop reason: HOSPADM

## 2018-03-12 RX ORDER — FENTANYL CITRATE 50 UG/ML
INJECTION, SOLUTION INTRAMUSCULAR; INTRAVENOUS
Status: DISCONTINUED | OUTPATIENT
Start: 2018-03-12 | End: 2018-03-12

## 2018-03-12 RX ORDER — LIDOCAINE HCL/PF 100 MG/5ML
SYRINGE (ML) INTRAVENOUS
Status: DISCONTINUED | OUTPATIENT
Start: 2018-03-12 | End: 2018-03-12

## 2018-03-12 RX ORDER — EPINEPHRINE CONVENIENCE KIT 1 MG/ML(1)
KIT INTRAMUSCULAR; SUBCUTANEOUS
Status: DISCONTINUED | OUTPATIENT
Start: 2018-03-12 | End: 2018-03-12 | Stop reason: HOSPADM

## 2018-03-12 RX ORDER — ROCURONIUM BROMIDE 10 MG/ML
INJECTION, SOLUTION INTRAVENOUS
Status: DISCONTINUED | OUTPATIENT
Start: 2018-03-12 | End: 2018-03-12

## 2018-03-12 RX ORDER — SODIUM CHLORIDE 9 MG/ML
INJECTION, SOLUTION INTRAVENOUS CONTINUOUS
Status: DISCONTINUED | OUTPATIENT
Start: 2018-03-12 | End: 2018-03-12 | Stop reason: HOSPADM

## 2018-03-12 RX ORDER — OXYCODONE HYDROCHLORIDE 5 MG/1
5 TABLET ORAL EVERY 4 HOURS PRN
Qty: 40 TABLET | Refills: 0 | Status: SHIPPED | OUTPATIENT
Start: 2018-03-12 | End: 2018-03-20 | Stop reason: SDUPTHER

## 2018-03-12 RX ORDER — PROPOFOL 10 MG/ML
VIAL (ML) INTRAVENOUS
Status: DISCONTINUED | OUTPATIENT
Start: 2018-03-12 | End: 2018-03-12

## 2018-03-12 RX ORDER — MIDAZOLAM HYDROCHLORIDE 1 MG/ML
INJECTION, SOLUTION INTRAMUSCULAR; INTRAVENOUS
Status: DISCONTINUED | OUTPATIENT
Start: 2018-03-12 | End: 2018-03-12

## 2018-03-12 RX ORDER — METRONIDAZOLE 500 MG/100ML
INJECTION, SOLUTION INTRAVENOUS
Status: DISCONTINUED | OUTPATIENT
Start: 2018-03-12 | End: 2018-03-12

## 2018-03-12 RX ORDER — POLYETHYLENE GLYCOL 3350 17 G/17G
17 POWDER, FOR SOLUTION ORAL DAILY
Refills: 0 | COMMUNITY
Start: 2018-03-12 | End: 2018-10-25

## 2018-03-12 RX ORDER — CEFAZOLIN SODIUM 1 G/3ML
INJECTION, POWDER, FOR SOLUTION INTRAMUSCULAR; INTRAVENOUS
Status: DISCONTINUED | OUTPATIENT
Start: 2018-03-12 | End: 2018-03-12

## 2018-03-12 RX ADMIN — EPINEPHRINE 2 MCG: 1 INJECTION, SOLUTION INTRAMUSCULAR; SUBCUTANEOUS at 10:03

## 2018-03-12 RX ADMIN — GELATIN ABSORBABLE SPONGE 12-7 MM 2 EACH: 12-7 MISC at 10:03

## 2018-03-12 RX ADMIN — ONDANSETRON 4 MG: 2 INJECTION INTRAMUSCULAR; INTRAVENOUS at 10:03

## 2018-03-12 RX ADMIN — BUPIVACAINE 20 ML: 13.3 INJECTION, SUSPENSION, LIPOSOMAL INFILTRATION at 10:03

## 2018-03-12 RX ADMIN — EPHEDRINE SULFATE 10 MG: 50 INJECTION, SOLUTION INTRAMUSCULAR; INTRAVENOUS; SUBCUTANEOUS at 10:03

## 2018-03-12 RX ADMIN — OXYCODONE HYDROCHLORIDE 5 MG: 5 TABLET ORAL at 12:03

## 2018-03-12 RX ADMIN — ROCURONIUM BROMIDE 50 MG: 10 INJECTION, SOLUTION INTRAVENOUS at 09:03

## 2018-03-12 RX ADMIN — PROPOFOL 140 MG: 10 INJECTION, EMULSION INTRAVENOUS at 09:03

## 2018-03-12 RX ADMIN — NEOSTIGMINE METHYLSULFATE 4 MG: 1 INJECTION INTRAVENOUS at 11:03

## 2018-03-12 RX ADMIN — GLYCOPYRROLATE 0.4 MG: 0.2 INJECTION, SOLUTION INTRAMUSCULAR; INTRAVENOUS at 11:03

## 2018-03-12 RX ADMIN — MIDAZOLAM HYDROCHLORIDE 2 MG: 1 INJECTION, SOLUTION INTRAMUSCULAR; INTRAVENOUS at 08:03

## 2018-03-12 RX ADMIN — LIDOCAINE HYDROCHLORIDE 1 MG: 20 INJECTION, SOLUTION INTRAVENOUS at 09:03

## 2018-03-12 RX ADMIN — MUPIROCIN: 20 OINTMENT TOPICAL at 08:03

## 2018-03-12 RX ADMIN — LIDOCAINE HYDROCHLORIDE 0.1 MG: 10 INJECTION, SOLUTION EPIDURAL; INFILTRATION; INTRACAUDAL; PERINEURAL at 08:03

## 2018-03-12 RX ADMIN — SODIUM CHLORIDE: 0.9 INJECTION, SOLUTION INTRAVENOUS at 08:03

## 2018-03-12 RX ADMIN — FENTANYL CITRATE 50 MCG: 50 INJECTION, SOLUTION INTRAMUSCULAR; INTRAVENOUS at 10:03

## 2018-03-12 RX ADMIN — FENTANYL CITRATE 25 MCG: 50 INJECTION, SOLUTION INTRAMUSCULAR; INTRAVENOUS at 12:03

## 2018-03-12 RX ADMIN — FENTANYL CITRATE 100 MCG: 50 INJECTION, SOLUTION INTRAMUSCULAR; INTRAVENOUS at 09:03

## 2018-03-12 RX ADMIN — FENTANYL CITRATE 50 MCG: 50 INJECTION, SOLUTION INTRAMUSCULAR; INTRAVENOUS at 08:03

## 2018-03-12 RX ADMIN — BUPIVACAINE HYDROCHLORIDE 30 ML: 2.5 INJECTION, SOLUTION EPIDURAL; INFILTRATION; INTRACAUDAL; PERINEURAL at 10:03

## 2018-03-12 RX ADMIN — CEFAZOLIN 2 G: 330 INJECTION, POWDER, FOR SOLUTION INTRAMUSCULAR; INTRAVENOUS at 09:03

## 2018-03-12 RX ADMIN — METRONIDAZOLE 500 MG: 500 INJECTION, SOLUTION INTRAVENOUS at 09:03

## 2018-03-12 NOTE — TRANSFER OF CARE
"Anesthesia Transfer of Care Note    Patient: Yvette Reyes    Procedure(s) Performed: Procedure(s) (LRB):  [id number], prone (N/A)  EXAM UNDER ANESTHESIA    Patient location: PACU    Anesthesia Type: general    Transport from OR: Transported from OR on 100% O2 by closed face mask with adequate spontaneous ventilation    Post pain: adequate analgesia    Post assessment: no apparent anesthetic complications    Post vital signs: stable    Level of consciousness: sedated    Complications: none    Transfer of care protocol was followed      Last vitals:   Visit Vitals  /62   Pulse (!) 55   Temp 36.6 °C (97.8 °F) (Temporal)   Resp 16   Ht 5' 2" (1.575 m)   Wt 86.2 kg (190 lb)   LMP 02/24/2018 (Exact Date)   SpO2 100%   Breastfeeding? Yes   BMI 34.75 kg/m²     "

## 2018-03-12 NOTE — DISCHARGE INSTRUCTIONS
Post anorectal surgery instructions:     You had an endorectal advancement flap for treatment of anal fistula.   The outer part of the fistula was opened and a gauze was placed there.     Some bleeding is expected.  You have a gauze in the outer part of the fistula.  Please remove the gauze tomorrow in the shower.  You do not need to re-pack that area.      You have packing in the anus which will be expelled with the next bowel movement. There is packing in the external opening of the fistula. This can be removed in 48 hours or sooner as needed after a bowel movement.     No bathes please.  Ok to shower.      Take Miralax twice a day to keep the stool soft. The goal is 1-2 nonstraining nonloose bowel movements per day.    Take fiber supplements such as Metamucil, Citrucel, Konsyl, etc. We recommend 25-30 grams of fiber daily or reaching the above bowel movement goal. Stay hydrated - drink 8x 8 ounce glasses of water a day.

## 2018-03-12 NOTE — OP NOTE
DATE OF PROCEDURE:  03/12/2018.    PREOPERATIVE DIAGNOSIS:  Anterior transsphincteric anal fistula.    POSTOPERATIVE DIAGNOSIS:  Anterior transsphincteric anal fistula.    PROCEDURE PERFORMED:  Endorectal advancement flap.    ATTENDING SURGEON:  Skip Benson M.D.    FELLOW:  Sylvia Lane M.D.    ANESTHESIA:  General.    ESTIMATED BLOOD LOSS:  50.    SPECIMENS:  None.    FINDINGS:  Anterior transsphincteric anal fistula with the external opening   found on the right anterior aspect approximately 3 cm from the anal canal and   the internal opening just below the dentate line on the right anterior side.    The fistula did track up and around majority of the external sphincter muscle.    COMPLICATIONS:  None apparent.    DISPOSITION:  PACU, then home.    INDICATION:  Mrs. Reyes is a 32-year-old woman who I recently did incision   and drainage and placement of a Seton approximately six weeks ago for a high   transsphincteric anal fistula seen anteriorly.  This was initially found after a   spontaneous vaginal delivery where a cyst by the vagina was incised.  This   ended up developing into an long anal fistula.  She had since healed up the prior   excisional partial fistulectomy that I performed.  Her fistula tract appeared dry.  I   therefore discussed with her the options for repair and recommended endorectal   advancement flap given its anterior location.  She demonstrated understanding of   the risks, benefits and alternatives and wished to proceed with surgery.    DESCRIPTION OF PROCEDURE:  After informed consent was reviewed, the patient was   taken to the OR and placed under general anesthesia.  She was then placed in the   prone jackknife position.  Ancef and Flagyl were given for preoperative   antibiotics and SCDs were placed for DVT prophylaxis.  The buttocks were then   taped apart and the perianal skin was prepped and draped in the usual sterile   fashion.  Digital exam was first  performed demonstrating that she did have a   high transsphincteric anal fistula felt anteriorly.  The [place] was then   placed.  Lighted Hill-Carbajal retractors were then used to evaluate the anal   anatomy.  She had one opening seen anteriorly at the right anterior side that   was below the dentate line.  First, the area for the flap was marked with   electrocautery.  The flap measured approximately 5 cm long and was roughly 40%   of the circumference of the rectum more proximally.  Distally, a 2 cm transverse   incision was made distal the level of the fistula internal opening.  This continued through the   internal sphincter muscle to the intersphincteric plane.  This then continued   out laterally.  This broadened as we dissected proximally so that the base was   at least 2 times the width as the apex of the flap.  Dissection continued in the   intersphincteric plane.  As we progressed proximally, this progressed to the   rectovaginal septum.  The vagina was carefully protected and swept downward.  In   the rectovaginal septum, diluted epinephrine solution was injected to assist   with dissection.  With the flap fully mobilized, a fistula probe was then placed   through the external tract.  This coursed superiorly and around the external   sphincter muscles.  There were actually two defects found in the external   sphincter muscle that were seen with injection of hydrogen peroxide.  Both of these were oversewn with individual 3-0 PDS suture.  The fistula was then tested with hydrogen peroxide and demonstrated no   extravasation of hydrogen peroxide internally.  The fibers overlying the   external sphincter were then imbricated on top of the fistula tract as a second   layer closure.  Rut powder was placed for topical hemostatic agent.  The   distal aspect of the flap where the fistula was, was then trimmed off sharply.    This did bleed well.  Hemostasis was again ensured.  The flap was then sewed   down  with interrupted 3-0 Vicryl sutures.  Care was taken to advance the flap   with each suture laterally to offload tension on the distal suture line.  Both   the deep and superficial layers of the bowel were sutured back together.  The   flap appeared well vascularized.  Two pieces of Gelfoam were then placed into   the anal canal for topical hemostatic.  50 mL of 0.25% Marcaine mixed with   Exparel were injected as a perianal block.    Attention was then turned towards the external fistula opening.  She did have   some granulation tissue around this area.  This was excised with electrocautery.    A curette was used to dissect in the fistula tract.  This resulted in moderate   amount of bleeding.  This was controlled with topical electrocautery.  However,   she continued to have some ongoing bleeding and did require a stitch with a 3-0   Vicryl suture for hemostasis.  The wound was then irrigated and hemostasis was   noted here.  This was packed with a 4 x 4.  Fluff dressings were then applied.    The patient tolerated the procedure well.  There were no apparent intraoperative   complications.  All sponge, needle, and instrument counts were correct x2.  She   was extubated and taken to PACU in stable condition.      DAVON  dd: 03/12/2018 11:37:43 (CDT)  td: 03/12/2018 12:41:59 (CDT)  Doc ID   #[id number]  Job ID #[id number]    CC:

## 2018-03-12 NOTE — PLAN OF CARE
Pt resting comfortably.    Call light in reach.    No questions or concerns at this time.     at bedside

## 2018-03-12 NOTE — PLAN OF CARE
PT is AAOx4. VSS. NAD. IV discontinued. Discharge instructions given. Verbalized understanding. Pt voided without difficulty. Discharged home with family.

## 2018-03-12 NOTE — ANESTHESIA POSTPROCEDURE EVALUATION
"Anesthesia Post Evaluation    Patient: Yvette Reyes    Procedure(s) Performed: Procedure(s) (LRB):  [id number], prone (N/A)  EXAM UNDER ANESTHESIA    Final Anesthesia Type: general  Patient location during evaluation: PACU  Patient participation: Yes- Able to Participate  Level of consciousness: oriented, lethargic and awake  Post-procedure vital signs: reviewed and stable  Pain management: adequate  Airway patency: patent  PONV status at discharge: No PONV  Anesthetic complications: no      Cardiovascular status: blood pressure returned to baseline  Respiratory status: unassisted, room air and spontaneous ventilation  Hydration status: euvolemic  Follow-up not needed.        Visit Vitals  /63 (BP Location: Left arm, Patient Position: Lying)   Pulse 60   Temp 36.6 °C (97.9 °F) (Temporal)   Resp 15   Ht 5' 2" (1.575 m)   Wt 86.2 kg (190 lb)   LMP 02/24/2018 (Exact Date)   SpO2 100%   Breastfeeding? Yes   BMI 34.75 kg/m²       Pain/Farideh Score: Pain Assessment Performed: Yes (3/12/2018 11:30 AM)  Presence of Pain: non-verbal indicators absent (3/12/2018 11:30 AM)  Pain Rating Prior to Med Admin: 6 (3/12/2018 12:05 PM)  Farideh Score: 8 (3/12/2018 11:30 AM)      "

## 2018-03-12 NOTE — BRIEF OP NOTE
[facility]  Brief Operative Note     SUMMARY     Surgery Date: 3/12/2018     Surgeon(s) and Role:     * Skip Benson MD - Primary    Assisting Surgeon: Danuta Lane MD - fellow     Pre-op Diagnosis:  Perianal fistula [K60.3]    Post-op Diagnosis:  Post-Op Diagnosis Codes:     * Perianal fistula [K60.3]    Procedure(s) (LRB):  [id number], prone (N/A)  EXAM UNDER ANESTHESIA    Anesthesia: General endotracheal anesthesia, local with marcaine and exparel    Description of the findings of the procedure: right anterior fistula  With superficial internal opening and an additional deeper opening at dentate line. No evidence of infection. Endorectal advancement performed in intersphincteric plane without tension. External opening widened and tract curetted.     Findings/Key Components: As above    Estimated Blood Loss: 50mL         Specimens:   Specimen (12h ago through future)    None          Discharge Note    SUMMARY     Admit Date: 3/12/2018    Discharge Date and Time:  03/12/2018 11:20 AM    Hospital Course (synopsis of major diagnoses, care, treatment, and services provided during the course of the hospital stay): Underwent elective procedure as above which she tolerated well without complication.      Final Diagnosis: Post-Op Diagnosis Codes:     * Perianal fistula [K60.3]    Disposition: Home or Self Care    Follow Up/Patient Instructions:     Medications:  Reconciled Home Medications:   Current Discharge Medication List      START taking these medications    Details   acetaminophen (TYLENOL) 650 MG TbSR Take 1 tablet (650 mg total) by mouth every 8 (eight) hours.  Refills: 0      oxyCODONE (ROXICODONE) 5 MG immediate release tablet Take 1 tablet (5 mg total) by mouth every 4 (four) hours as needed for Pain.  Qty: 40 tablet, Refills: 0      polyethylene glycol (GLYCOLAX) 17 gram PwPk Take 17 g by mouth once daily. Take while taking narcotics to avoid  constipation  Refills: 0         CONTINUE these medications which have NOT CHANGED    Details   PNV 11-iron fum-folic acid-om3 (VIVA DHA) 28-1-200 mg Cap Take 1 capsule by mouth once daily.  Qty: 30 each, Refills: 12             Discharge Procedure Orders  Diet general     Call MD for:  temperature >100.4     Call MD for:  persistent nausea and vomiting     Call MD for:  severe uncontrolled pain     Call MD for:  redness, tenderness, or signs of infection (pain, swelling, redness, odor or green/yellow discharge around incision site)     Post anorectal surgery instructions:     You had an endorectal advancement flap for treatment of anal fistula.     Some bleeding is expected.     You have packing in the anus which will be expelled with the next bowel movement. There is packing in the external opening of the fistula. This can be removed in 48 hours or sooner as needed after a bowel movement.     Do Sitz Baths or tub soaks three times a day in warm water as needed for pain or hygiene.     Take laxatives (miralax) to keep the stool soft. The goal is 1-2 nonstraining nonloose bowel movements per day.    Take fiber supplements such as Metamucil, Citrucel, Konsyl, etc. We recommend 25-30 grams of fiber daily or reaching the above bowel movement goal. Stay hydrated - drink 8x 8 ounce glasses of water a day.     Follow-up Information     Skip Benson MD. Schedule an appointment as soon as possible for a visit in 2 weeks.    Specialty:  Colon and Rectal Surgery  Contact information:  [facility] [facility] [address] [phone]

## 2018-03-12 NOTE — H&P (VIEW-ONLY)
"  CRS Office Visit  Referring Md:   No referring provider defined for this encounter.    SUBJECTIVE:     Chief Complaint: followup    History of Present Illness:  Patient is a 32 y.o. female presents with followup from surgery.     Operation: Transsphincteric perianal fistula.    1/25/2018   PROCEDURES PERFORMED:  1.  Partial fistulectomy.  2.  Seton placement.    Pathology: none    2/12: doing well.  Wound healing  3/8: Overall doing very well.  Still has continued drainage.  Pain is improved dramatically.  Feels the wound is healing excellently.  Is ready for her advancement flap    Review of Systems:  Review of Systems   Constitutional: Negative for chills and fever.   Skin: Positive for rash.   Psychiatric/Behavioral: The patient is nervous/anxious.        OBJECTIVE:     Vital Signs (Most Recent)  /87 (BP Location: Left arm, Patient Position: Sitting, BP Method: Large (Automatic))   Pulse 86   Ht 5' 2" (1.575 m)   Wt 86.4 kg (190 lb 7.6 oz)   BMI 34.84 kg/m²     Physical Exam:  General:  female in no distress   Respiratory: respirations are even and unlabored  Cardiac: regular rate  Abdomen: Soft, nontender, no masses  Extremities: Warm dry and intact  Anorectal: Granulation tissue seen on the right anterior side around the seton.  The seton appears dry.  No further undrained fluid collection.    ASSESSMENT/PLAN:     Yvette was seen today for follow-up.    Diagnoses and all orders for this visit:    Perianal fistula  -     Case Request Operating Room: [id number], prone        32 y.o. female post op from partial fistulectomy with seton placement.   Overall, she appears to be healing well.  We will plan for a endorectal advancement flap.  Today, we discussed the procedure in detail.  She understands that it has a proximal 60-70% success rate.  Her external tract would be debrided at the time of surgery.  All questions were answered.  She will need to be in the prone position since " the internal opening is anterior.    KENAN Bensno MD  Staff Surgeon  Colon & Rectal Surgery

## 2018-03-15 ENCOUNTER — PATIENT MESSAGE (OUTPATIENT)
Dept: SURGERY | Facility: CLINIC | Age: 33
End: 2018-03-15

## 2018-03-19 ENCOUNTER — PATIENT MESSAGE (OUTPATIENT)
Dept: SURGERY | Facility: CLINIC | Age: 33
End: 2018-03-19

## 2018-03-20 ENCOUNTER — PATIENT MESSAGE (OUTPATIENT)
Dept: SURGERY | Facility: CLINIC | Age: 33
End: 2018-03-20

## 2018-03-20 RX ORDER — OXYCODONE HYDROCHLORIDE 5 MG/1
5 TABLET ORAL EVERY 4 HOURS PRN
Qty: 40 TABLET | Refills: 0 | Status: SHIPPED | OUTPATIENT
Start: 2018-03-20 | End: 2018-05-31

## 2018-03-21 NOTE — PROGRESS NOTES
Called patient and reviewed medications with her.  She is still having some pain as expected.  A refill of oxycodone will be given.      KENAN Benson MD  Staff Surgeon  Colon & Rectal Surgery

## 2018-03-28 NOTE — PROGRESS NOTES
"  CRS Office Post Operative Visit  Referring Md:   No referring provider defined for this encounter.    SUBJECTIVE:     Chief Complaint: followup    History of Present Illness:  Patient is a 32 y.o. female presents with followup from surgery for Transsphincteric perianal fistula.    PROCEDURES PERFORMED:  1/25/2018  1.  Partial fistulectomy.  2.  Seton placement.    3/12/2018:  Endorectal advancement flap.    Pathology: none    She presented today for first follow-up.  She is having drainage from the rectum.  Her pain is improved.    Review of Systems:  Review of Systems   Constitutional: Negative for chills and fever.   Skin: Positive for rash.   Psychiatric/Behavioral: The patient is nervous/anxious.        OBJECTIVE:     Vital Signs (Most Recent)  BP (!) 82/64   Pulse 95   Ht 5' 2.01" (1.575 m)   Wt 86.2 kg (190 lb 0.6 oz)   BMI 34.75 kg/m²     Physical Exam:  General:  female in no distress   Respiratory: respirations are even and unlabored  Cardiac: regular rate  Abdomen: Soft, nontender, no masses  Extremities: Warm dry and intact  Anorectal: External opening on the right anterior has pink granulation tissue.  There is no active drainage.  On eversion of the anal canal, the mucosa appears intact.  Digital exam was not performed secondary to patient discomfort      ASSESSMENT/PLAN:     Diagnoses and all orders for this visit:    Anal fistula        32 y.o. female post op from partial fistulectomy with seton placement followed by endorectal advancement flap.    She is overall doing well.  She has perianal pain associated with some leakage.  We will attempt a trial of fiber and barrier cream.  We reviewed all of her medications.  She will take MiraLAX once a day down from twice a day.  She is currently having multiple bowel movements per day.  She will continue on her fiber.  No food or activity restrictions.  I will see her in 2 weeks.      KENAN Benson MD  Staff Surgeon  Colon & Rectal " Surgery

## 2018-03-29 ENCOUNTER — OFFICE VISIT (OUTPATIENT)
Dept: SURGERY | Facility: CLINIC | Age: 33
End: 2018-03-29
Payer: COMMERCIAL

## 2018-03-29 VITALS
WEIGHT: 190.06 LBS | BODY MASS INDEX: 34.98 KG/M2 | HEART RATE: 95 BPM | SYSTOLIC BLOOD PRESSURE: 82 MMHG | DIASTOLIC BLOOD PRESSURE: 64 MMHG | HEIGHT: 62 IN

## 2018-03-29 DIAGNOSIS — K60.3 ANAL FISTULA: Primary | ICD-10-CM

## 2018-03-29 PROCEDURE — 99999 PR PBB SHADOW E&M-EST. PATIENT-LVL III: CPT | Mod: PBBFAC,,, | Performed by: COLON & RECTAL SURGERY

## 2018-03-29 PROCEDURE — 99024 POSTOP FOLLOW-UP VISIT: CPT | Mod: S$GLB,,, | Performed by: COLON & RECTAL SURGERY

## 2018-04-12 ENCOUNTER — PATIENT MESSAGE (OUTPATIENT)
Dept: SURGERY | Facility: CLINIC | Age: 33
End: 2018-04-12

## 2018-04-18 NOTE — PROGRESS NOTES
"  CRS Office Post Operative Visit  Referring Md:   No referring provider defined for this encounter.    SUBJECTIVE:     Chief Complaint: followup    History of Present Illness:  Patient is a 32 y.o. female presents with followup from surgery for Transsphincteric perianal fistula.    PROCEDURES PERFORMED:  1/25/2018  1.  Partial fistulectomy.  2.  Seton placement.    3/12/2018:  Endorectal advancement flap.    Pathology: none    Drainage has improved over the last few days.  Pain is improved.  Bowel movements returned to normal.      Review of Systems:  Review of Systems   Constitutional: Negative for chills and fever.   Skin: Negative for rash.   Psychiatric/Behavioral: The patient is nervous/anxious.        OBJECTIVE:     Vital Signs (Most Recent)  BP (!) 147/82   Pulse 82   Ht 5' 2.01" (1.575 m)   Wt 83.8 kg (184 lb 11.9 oz)   BMI 33.78 kg/m²     Physical Exam:  General:  female in no distress   Respiratory: respirations are even and unlabored  Cardiac: regular rate  Abdomen: Soft, nontender, no masses  Extremities: Warm dry and intact  Anorectal: External exam reveals granulation tissue seen on the right anterior position with some scar tissue.  External exam was otherwise normal.  Digital exam was performed.  There is separation of the endorectal advancement flap felt anteriorly.  Separation is approximately 1cm.  No underlying fluid collection.      ASSESSMENT/PLAN:     Diagnoses and all orders for this visit:    Anal fistula        32 y.o. female post op from partial fistulectomy with seton placement followed by endorectal advancement flap.  She is overall recovering well.  I'll plan to see her back in 6 weeks for repeat examination.        KENAN Benson MD  Staff Surgeon  Colon & Rectal Surgery      "

## 2018-04-19 ENCOUNTER — OFFICE VISIT (OUTPATIENT)
Dept: SURGERY | Facility: CLINIC | Age: 33
End: 2018-04-19
Payer: COMMERCIAL

## 2018-04-19 VITALS
SYSTOLIC BLOOD PRESSURE: 147 MMHG | DIASTOLIC BLOOD PRESSURE: 82 MMHG | HEIGHT: 62 IN | HEART RATE: 82 BPM | BODY MASS INDEX: 34 KG/M2 | WEIGHT: 184.75 LBS

## 2018-04-19 DIAGNOSIS — K60.3 ANAL FISTULA: Primary | ICD-10-CM

## 2018-04-19 PROCEDURE — 99024 POSTOP FOLLOW-UP VISIT: CPT | Mod: S$GLB,,, | Performed by: COLON & RECTAL SURGERY

## 2018-04-19 PROCEDURE — 99999 PR PBB SHADOW E&M-EST. PATIENT-LVL III: CPT | Mod: PBBFAC,,, | Performed by: COLON & RECTAL SURGERY

## 2018-05-08 RX ORDER — PNV 11/IRON FUM/FOLIC ACID/OM3 28-1-200MG
CAPSULE ORAL
Qty: 30 EACH | Refills: 12 | Status: SHIPPED | OUTPATIENT
Start: 2018-05-08 | End: 2018-10-25

## 2018-05-30 NOTE — PROGRESS NOTES
"  CRS Office Post Operative Visit    SUBJECTIVE:     Chief Complaint: followup    History of Present Illness:  Patient is a 32 y.o. female presents with followup from surgery for Transsphincteric perianal fistula.    PROCEDURES PERFORMED:  1/25/2018  1.  Partial fistulectomy.  2.  Seton placement.    3/12/2018:  Endorectal advancement flap.    Pathology: none    4/19/18: Drainage has improved over the last few days.  Pain is improved.  Bowel movements returned to normal.  On her exam, separation of the endorectal advancement flap felt anteriorly.  5/31/18: Drainage has resolved.  Pain is no longer present.  Bowel movements are normal.    Review of Systems:  Review of Systems   Constitutional: Negative for chills and fever.   Gastrointestinal: Negative for blood in stool, constipation and diarrhea.   Skin: Negative for rash.   Psychiatric/Behavioral: The patient is not nervous/anxious.        OBJECTIVE:     Vital Signs (Most Recent)  BP (!) 177/93 (BP Location: Left arm, Patient Position: Sitting, BP Method: Large (Automatic))   Pulse 100   Ht 5' 2" (1.575 m)   Wt 87.6 kg (193 lb 2 oz)   BMI 35.32 kg/m²     Physical Exam:  General:  female in no distress   Respiratory: respirations are even and unlabored  Cardiac: regular rate  Abdomen: Soft, nontender, no masses  Extremities: Warm dry and intact  Anorectal: External exam reveals scar on the right anterior aspect where her fistulectomy was performed.  Digital exam was performed.  She still has separation of the mucosa anteriorly.    ASSESSMENT/PLAN:     Diagnoses and all orders for this visit:    Anal fistula      32 y.o. female post op from partial fistulectomy with seton placement followed by endorectal advancement flap.  Delayed wound healing of the mucosa internally.  No recurrent fistula identified.  Overall appears to be healing as expected.  She can follow-up with me as needed    KENAN Benson MD  Staff Surgeon  Colon & Rectal Surgery      "

## 2018-05-31 ENCOUNTER — OFFICE VISIT (OUTPATIENT)
Dept: SURGERY | Facility: CLINIC | Age: 33
End: 2018-05-31
Payer: COMMERCIAL

## 2018-05-31 VITALS
HEART RATE: 100 BPM | SYSTOLIC BLOOD PRESSURE: 177 MMHG | BODY MASS INDEX: 35.54 KG/M2 | WEIGHT: 193.13 LBS | HEIGHT: 62 IN | DIASTOLIC BLOOD PRESSURE: 93 MMHG

## 2018-05-31 DIAGNOSIS — K60.3 ANAL FISTULA: Primary | ICD-10-CM

## 2018-05-31 PROCEDURE — 99999 PR PBB SHADOW E&M-EST. PATIENT-LVL II: CPT | Mod: PBBFAC,,, | Performed by: COLON & RECTAL SURGERY

## 2018-05-31 PROCEDURE — 99024 POSTOP FOLLOW-UP VISIT: CPT | Mod: S$GLB,,, | Performed by: COLON & RECTAL SURGERY

## 2018-07-08 ENCOUNTER — PATIENT MESSAGE (OUTPATIENT)
Dept: SURGERY | Facility: CLINIC | Age: 33
End: 2018-07-08

## 2018-07-26 ENCOUNTER — OFFICE VISIT (OUTPATIENT)
Dept: SURGERY | Facility: CLINIC | Age: 33
End: 2018-07-26
Payer: COMMERCIAL

## 2018-07-26 VITALS
BODY MASS INDEX: 36.1 KG/M2 | HEART RATE: 97 BPM | HEIGHT: 62 IN | DIASTOLIC BLOOD PRESSURE: 98 MMHG | SYSTOLIC BLOOD PRESSURE: 132 MMHG | WEIGHT: 196.19 LBS

## 2018-07-26 DIAGNOSIS — K60.3 ANAL FISTULA: Primary | ICD-10-CM

## 2018-07-26 PROCEDURE — 3008F BODY MASS INDEX DOCD: CPT | Mod: CPTII,S$GLB,, | Performed by: COLON & RECTAL SURGERY

## 2018-07-26 PROCEDURE — 99999 PR PBB SHADOW E&M-EST. PATIENT-LVL III: CPT | Mod: PBBFAC,,, | Performed by: COLON & RECTAL SURGERY

## 2018-07-26 PROCEDURE — 99213 OFFICE O/P EST LOW 20 MIN: CPT | Mod: S$GLB,,, | Performed by: COLON & RECTAL SURGERY

## 2018-07-26 NOTE — PROGRESS NOTES
"  CRS Office Followup Visit    SUBJECTIVE:     Chief Complaint: followup    History of Present Illness:  Patient is a 32 y.o. female presents with followup from surgery for Transsphincteric perianal fistula.    PROCEDURES PERFORMED:  1/25/2018  1.  Partial fistulectomy.  2.  Seton placement.    3/12/2018:  Endorectal advancement flap.    Pathology: none    4/19/18: Drainage has improved over the last few days.  Pain is improved.  Bowel movements returned to normal.  On her exam, separation of the endorectal advancement flap felt anteriorly.  5/31/18: Drainage has resolved.  Pain is no longer present.  Bowel movements are normal.  7/26/18:  Presents today with intermittent perianal pain and burning.  No further drainage. No further abscess.  Overall feels improved.    Review of Systems:  Review of Systems   Constitutional: Negative for chills and fever.   Gastrointestinal: Negative for blood in stool, constipation and diarrhea.   Skin: Negative for rash.   Psychiatric/Behavioral: The patient is not nervous/anxious.        OBJECTIVE:     Vital Signs (Most Recent)  BP (!) 132/98   Pulse 97   Ht 5' 2.01" (1.575 m)   Wt 89 kg (196 lb 3.4 oz)   BMI 35.88 kg/m²     Physical Exam:  General:  female in no distress   Respiratory: respirations are even and unlabored  Cardiac: regular rate  Abdomen: Soft, nontender, no masses  Extremities: Warm dry and intact  Anorectal: External exam reveals scar on the right anterior aspect where her fistulectomy was performed.  Digital exam was performed.  She still has separation of the mucosa anteriorly.  Mucosal separation appears to be healing.  No recurrent fluid collections.    ASSESSMENT/PLAN:     Diagnoses and all orders for this visit:    Anal fistula    Other orders  -     cocoa butter-zinc oxide 76-10 % Supp; Place 1 suppository rectally 2 (two) times daily.         32 y.o. female post op from partial fistulectomy with seton placement followed by endorectal advancement " flap.  Delayed wound healing of the mucosa internally.  No recurrent fistula identified.  Overall appears to be healing as expected.  Topical suppositories were given for relief of perianal irritation.  She can follow up with me with any further concerns.    KENAN Benson MD  Staff Surgeon  Colon & Rectal Surgery

## 2018-10-24 NOTE — PROGRESS NOTES
"  CRS Office Followup Visit    SUBJECTIVE:     Chief Complaint: followup    History of Present Illness:  Patient is a 32 y.o. female presents with followup from surgery for Transsphincteric perianal fistula.    PROCEDURES PERFORMED:  1/25/2018  1.  Partial fistulectomy.  2.  Seton placement.    3/12/2018:  Endorectal advancement flap.    Pathology: none    4/19/18: Drainage has improved over the last few days.  Pain is improved.  Bowel movements returned to normal.  On her exam, separation of the endorectal advancement flap felt anteriorly.  5/31/18: Drainage has resolved.  Pain is no longer present.  Bowel movements are normal.  7/26/18:  Presents today with intermittent perianal pain and burning.  No further drainage. No further abscess.  Overall feels improved.  10/25/18:  Presents for concern of intermittent burning around the anal canal with discomfort.  This occurs roughly once to twice per month.  Last for 2-3 days.  She feels burning mostly on the outside.  No drainage.  No fevers or chills.  Normal bowel movements.    Review of Systems:  Review of Systems   Constitutional: Negative for chills, diaphoresis, fever, malaise/fatigue and weight loss.   HENT: Negative for congestion.    Respiratory: Negative for shortness of breath.    Cardiovascular: Negative for chest pain and leg swelling.   Gastrointestinal: Negative for abdominal pain, blood in stool, constipation, diarrhea, nausea and vomiting.   Genitourinary: Negative for dysuria.   Musculoskeletal: Negative for back pain and myalgias.   Skin: Positive for rash. Negative for itching.   Neurological: Negative for dizziness and weakness.   Endo/Heme/Allergies: Does not bruise/bleed easily.   Psychiatric/Behavioral: Negative for depression. The patient is not nervous/anxious.        OBJECTIVE:     Vital Signs (Most Recent)  /77 (BP Location: Left arm, Patient Position: Sitting, BP Method: Large (Automatic))   Pulse 109   Ht 5' 2" (1.575 m)   Wt 86.1 " kg (189 lb 13.1 oz)   BMI 34.72 kg/m²     Physical Exam:  General:  female in no distress   Respiratory: respirations are even and unlabored  Cardiac: regular rate  Abdomen: Soft, nontender, no masses  Extremities: Warm dry and intact  Anorectal: External exam reveals scar on the right anterior aspect where her fistulectomy was performed.  Digital exam was performed.  She is healing of the mucosa anteriorly.    Anoscopy was then performed. Healing mucosa seen on anoscopy.  No significant internal hemorrhoids were seen on anoscopy.        ASSESSMENT/PLAN:     Diagnoses and all orders for this visit:    Perianal fistula    Other orders  -     cocoa butter-zinc oxide 76-10 % Supp; Place 1 suppository rectally 2 (two) times daily.         32 y.o. female post op from partial fistulectomy with seton placement followed by endorectal advancement flap.  Delayed wound healing of the mucosa internally.  She has persistent perianal irritation.  This is intermittent.  We discussed using suppositories as a barrier cream.  Additionally, she should thickened her stool by using FiberCon every day.  She can follow up with me as needed      KENAN Benson MD  Staff Surgeon  Colon & Rectal Surgery

## 2018-10-25 ENCOUNTER — OFFICE VISIT (OUTPATIENT)
Dept: SURGERY | Facility: CLINIC | Age: 33
End: 2018-10-25
Payer: COMMERCIAL

## 2018-10-25 VITALS
HEIGHT: 62 IN | BODY MASS INDEX: 34.93 KG/M2 | SYSTOLIC BLOOD PRESSURE: 136 MMHG | HEART RATE: 109 BPM | WEIGHT: 189.81 LBS | DIASTOLIC BLOOD PRESSURE: 77 MMHG

## 2018-10-25 DIAGNOSIS — K60.3 PERIANAL FISTULA: Primary | ICD-10-CM

## 2018-10-25 PROCEDURE — 99213 OFFICE O/P EST LOW 20 MIN: CPT | Mod: 25,S$GLB,, | Performed by: COLON & RECTAL SURGERY

## 2018-10-25 PROCEDURE — 46600 DIAGNOSTIC ANOSCOPY SPX: CPT | Mod: S$GLB,,, | Performed by: COLON & RECTAL SURGERY

## 2018-10-25 PROCEDURE — 99999 PR PBB SHADOW E&M-EST. PATIENT-LVL III: CPT | Mod: PBBFAC,,, | Performed by: COLON & RECTAL SURGERY

## 2018-10-25 PROCEDURE — 3008F BODY MASS INDEX DOCD: CPT | Mod: CPTII,S$GLB,, | Performed by: COLON & RECTAL SURGERY

## 2018-12-07 ENCOUNTER — PATIENT MESSAGE (OUTPATIENT)
Dept: SURGERY | Facility: CLINIC | Age: 33
End: 2018-12-07

## 2020-04-23 DIAGNOSIS — Z01.84 ANTIBODY RESPONSE EXAMINATION: ICD-10-CM

## 2020-05-23 DIAGNOSIS — Z01.84 ANTIBODY RESPONSE EXAMINATION: ICD-10-CM

## 2020-06-22 DIAGNOSIS — Z01.84 ANTIBODY RESPONSE EXAMINATION: ICD-10-CM

## 2020-07-22 DIAGNOSIS — Z01.84 ANTIBODY RESPONSE EXAMINATION: ICD-10-CM

## 2020-08-21 DIAGNOSIS — Z01.84 ANTIBODY RESPONSE EXAMINATION: ICD-10-CM

## 2020-09-20 DIAGNOSIS — Z01.84 ANTIBODY RESPONSE EXAMINATION: ICD-10-CM

## 2020-10-20 DIAGNOSIS — Z01.84 ANTIBODY RESPONSE EXAMINATION: ICD-10-CM

## 2020-11-17 ENCOUNTER — CLINICAL SUPPORT (OUTPATIENT)
Dept: URGENT CARE | Facility: CLINIC | Age: 35
End: 2020-11-17
Payer: COMMERCIAL

## 2020-11-17 DIAGNOSIS — Z71.9 COUNSELED BY NURSE: Primary | ICD-10-CM

## 2020-11-17 LAB
CTP QC/QA: YES
SARS-COV-2 RDRP RESP QL NAA+PROBE: NEGATIVE

## 2020-11-17 PROCEDURE — U0002: ICD-10-PCS | Mod: QW,S$GLB,, | Performed by: NURSE PRACTITIONER

## 2020-11-17 PROCEDURE — U0002 COVID-19 LAB TEST NON-CDC: HCPCS | Mod: QW,S$GLB,, | Performed by: NURSE PRACTITIONER

## 2020-11-19 DIAGNOSIS — Z01.84 ANTIBODY RESPONSE EXAMINATION: ICD-10-CM

## 2021-07-29 ENCOUNTER — OFFICE VISIT (OUTPATIENT)
Dept: INTERNAL MEDICINE | Facility: CLINIC | Age: 36
End: 2021-07-29
Payer: COMMERCIAL

## 2021-07-29 VITALS
DIASTOLIC BLOOD PRESSURE: 74 MMHG | WEIGHT: 178 LBS | OXYGEN SATURATION: 98 % | SYSTOLIC BLOOD PRESSURE: 112 MMHG | TEMPERATURE: 99 F | HEIGHT: 62 IN | BODY MASS INDEX: 32.76 KG/M2 | HEART RATE: 110 BPM

## 2021-07-29 DIAGNOSIS — R50.9 FEVER, UNSPECIFIED FEVER CAUSE: Primary | ICD-10-CM

## 2021-07-29 PROCEDURE — 99999 PR PBB SHADOW E&M-EST. PATIENT-LVL III: ICD-10-PCS | Mod: PBBFAC,,, | Performed by: PHYSICIAN ASSISTANT

## 2021-07-29 PROCEDURE — 1160F RVW MEDS BY RX/DR IN RCRD: CPT | Mod: CPTII,S$GLB,, | Performed by: PHYSICIAN ASSISTANT

## 2021-07-29 PROCEDURE — 99203 OFFICE O/P NEW LOW 30 MIN: CPT | Mod: S$GLB,,, | Performed by: PHYSICIAN ASSISTANT

## 2021-07-29 PROCEDURE — U0003 INFECTIOUS AGENT DETECTION BY NUCLEIC ACID (DNA OR RNA); SEVERE ACUTE RESPIRATORY SYNDROME CORONAVIRUS 2 (SARS-COV-2) (CORONAVIRUS DISEASE [COVID-19]), AMPLIFIED PROBE TECHNIQUE, MAKING USE OF HIGH THROUGHPUT TECHNOLOGIES AS DESCRIBED BY CMS-2020-01-R: HCPCS | Performed by: PHYSICIAN ASSISTANT

## 2021-07-29 PROCEDURE — 3078F DIAST BP <80 MM HG: CPT | Mod: CPTII,S$GLB,, | Performed by: PHYSICIAN ASSISTANT

## 2021-07-29 PROCEDURE — 99999 PR PBB SHADOW E&M-EST. PATIENT-LVL III: CPT | Mod: PBBFAC,,, | Performed by: PHYSICIAN ASSISTANT

## 2021-07-29 PROCEDURE — U0005 INFEC AGEN DETEC AMPLI PROBE: HCPCS | Performed by: PHYSICIAN ASSISTANT

## 2021-07-29 PROCEDURE — 1126F AMNT PAIN NOTED NONE PRSNT: CPT | Mod: CPTII,S$GLB,, | Performed by: PHYSICIAN ASSISTANT

## 2021-07-29 PROCEDURE — 3074F SYST BP LT 130 MM HG: CPT | Mod: CPTII,S$GLB,, | Performed by: PHYSICIAN ASSISTANT

## 2021-07-29 PROCEDURE — 3008F PR BODY MASS INDEX (BMI) DOCUMENTED: ICD-10-PCS | Mod: CPTII,S$GLB,, | Performed by: PHYSICIAN ASSISTANT

## 2021-07-29 PROCEDURE — 1159F PR MEDICATION LIST DOCUMENTED IN MEDICAL RECORD: ICD-10-PCS | Mod: CPTII,S$GLB,, | Performed by: PHYSICIAN ASSISTANT

## 2021-07-29 PROCEDURE — 99203 PR OFFICE/OUTPT VISIT, NEW, LEVL III, 30-44 MIN: ICD-10-PCS | Mod: S$GLB,,, | Performed by: PHYSICIAN ASSISTANT

## 2021-07-29 PROCEDURE — 3008F BODY MASS INDEX DOCD: CPT | Mod: CPTII,S$GLB,, | Performed by: PHYSICIAN ASSISTANT

## 2021-07-29 PROCEDURE — 1160F PR REVIEW ALL MEDS BY PRESCRIBER/CLIN PHARMACIST DOCUMENTED: ICD-10-PCS | Mod: CPTII,S$GLB,, | Performed by: PHYSICIAN ASSISTANT

## 2021-07-29 PROCEDURE — 3074F PR MOST RECENT SYSTOLIC BLOOD PRESSURE < 130 MM HG: ICD-10-PCS | Mod: CPTII,S$GLB,, | Performed by: PHYSICIAN ASSISTANT

## 2021-07-29 PROCEDURE — 1159F MED LIST DOCD IN RCRD: CPT | Mod: CPTII,S$GLB,, | Performed by: PHYSICIAN ASSISTANT

## 2021-07-29 PROCEDURE — 1126F PR PAIN SEVERITY QUANTIFIED, NO PAIN PRESENT: ICD-10-PCS | Mod: CPTII,S$GLB,, | Performed by: PHYSICIAN ASSISTANT

## 2021-07-29 PROCEDURE — 3078F PR MOST RECENT DIASTOLIC BLOOD PRESSURE < 80 MM HG: ICD-10-PCS | Mod: CPTII,S$GLB,, | Performed by: PHYSICIAN ASSISTANT

## 2021-07-30 LAB
SARS-COV-2 RNA RESP QL NAA+PROBE: NOT DETECTED
SARS-COV-2- CYCLE NUMBER: -1

## 2022-01-24 ENCOUNTER — LAB VISIT (OUTPATIENT)
Dept: PRIMARY CARE CLINIC | Facility: CLINIC | Age: 37
End: 2022-01-24
Payer: OTHER GOVERNMENT

## 2022-01-24 DIAGNOSIS — Z20.822 CONTACT WITH AND (SUSPECTED) EXPOSURE TO COVID-19: ICD-10-CM

## 2022-01-24 LAB
CTP QC/QA: YES
SARS-COV-2 AG RESP QL IA.RAPID: NEGATIVE

## 2022-01-24 PROCEDURE — 87811 SARS-COV-2 COVID19 W/OPTIC: CPT

## 2022-02-02 NOTE — INTERVAL H&P NOTE
The patient has been examined and the H&P has been reviewed:    I concur with the findings and no changes have occurred since H&P was written.    Anesthesia/Surgery risks, benefits and alternative options discussed and understood by patient/family.          There are no hospital problems to display for this patient.    
Statement Selected

## 2022-04-22 ENCOUNTER — HOSPITAL ENCOUNTER (OUTPATIENT)
Dept: RADIOLOGY | Facility: HOSPITAL | Age: 37
Discharge: HOME | End: 2022-04-22
Attending: PODIATRIST
Payer: COMMERCIAL

## 2022-04-22 ENCOUNTER — OFFICE VISIT (OUTPATIENT)
Dept: PODIATRY | Facility: CLINIC | Age: 37
End: 2022-04-22
Payer: COMMERCIAL

## 2022-04-22 VITALS
HEART RATE: 69 BPM | WEIGHT: 178 LBS | BODY MASS INDEX: 32.76 KG/M2 | SYSTOLIC BLOOD PRESSURE: 113 MMHG | HEIGHT: 62 IN | DIASTOLIC BLOOD PRESSURE: 69 MMHG

## 2022-04-22 DIAGNOSIS — L30.9 DERMATITIS: ICD-10-CM

## 2022-04-22 DIAGNOSIS — M79.671 FOOT PAIN, BILATERAL: ICD-10-CM

## 2022-04-22 DIAGNOSIS — M79.672 FOOT PAIN, BILATERAL: Primary | ICD-10-CM

## 2022-04-22 DIAGNOSIS — M79.672 FOOT PAIN, BILATERAL: ICD-10-CM

## 2022-04-22 DIAGNOSIS — M79.671 FOOT PAIN, BILATERAL: Primary | ICD-10-CM

## 2022-04-22 PROCEDURE — 1160F PR REVIEW ALL MEDS BY PRESCRIBER/CLIN PHARMACIST DOCUMENTED: ICD-10-PCS | Mod: CPTII,S$GLB,, | Performed by: PODIATRIST

## 2022-04-22 PROCEDURE — 73630 XR FOOT COMPLETE 3 VIEW BILATERAL: ICD-10-PCS | Mod: 26,50,, | Performed by: RADIOLOGY

## 2022-04-22 PROCEDURE — 99999 PR PBB SHADOW E&M-EST. PATIENT-LVL IV: CPT | Mod: PBBFAC,,, | Performed by: PODIATRIST

## 2022-04-22 PROCEDURE — 1159F PR MEDICATION LIST DOCUMENTED IN MEDICAL RECORD: ICD-10-PCS | Mod: CPTII,S$GLB,, | Performed by: PODIATRIST

## 2022-04-22 PROCEDURE — 1160F RVW MEDS BY RX/DR IN RCRD: CPT | Mod: CPTII,S$GLB,, | Performed by: PODIATRIST

## 2022-04-22 PROCEDURE — 3078F DIAST BP <80 MM HG: CPT | Mod: CPTII,S$GLB,, | Performed by: PODIATRIST

## 2022-04-22 PROCEDURE — 1159F MED LIST DOCD IN RCRD: CPT | Mod: CPTII,S$GLB,, | Performed by: PODIATRIST

## 2022-04-22 PROCEDURE — 73630 X-RAY EXAM OF FOOT: CPT | Mod: 26,50,, | Performed by: RADIOLOGY

## 2022-04-22 PROCEDURE — 99999 PR PBB SHADOW E&M-EST. PATIENT-LVL IV: ICD-10-PCS | Mod: PBBFAC,,, | Performed by: PODIATRIST

## 2022-04-22 PROCEDURE — 3074F SYST BP LT 130 MM HG: CPT | Mod: CPTII,S$GLB,, | Performed by: PODIATRIST

## 2022-04-22 PROCEDURE — 3078F PR MOST RECENT DIASTOLIC BLOOD PRESSURE < 80 MM HG: ICD-10-PCS | Mod: CPTII,S$GLB,, | Performed by: PODIATRIST

## 2022-04-22 PROCEDURE — 3008F BODY MASS INDEX DOCD: CPT | Mod: CPTII,S$GLB,, | Performed by: PODIATRIST

## 2022-04-22 PROCEDURE — 73630 X-RAY EXAM OF FOOT: CPT | Mod: TC,50,FY,PO

## 2022-04-22 PROCEDURE — 3008F PR BODY MASS INDEX (BMI) DOCUMENTED: ICD-10-PCS | Mod: CPTII,S$GLB,, | Performed by: PODIATRIST

## 2022-04-22 PROCEDURE — 99204 PR OFFICE/OUTPT VISIT, NEW, LEVL IV, 45-59 MIN: ICD-10-PCS | Mod: S$GLB,,, | Performed by: PODIATRIST

## 2022-04-22 PROCEDURE — 3074F PR MOST RECENT SYSTOLIC BLOOD PRESSURE < 130 MM HG: ICD-10-PCS | Mod: CPTII,S$GLB,, | Performed by: PODIATRIST

## 2022-04-22 PROCEDURE — 99204 OFFICE O/P NEW MOD 45 MIN: CPT | Mod: S$GLB,,, | Performed by: PODIATRIST

## 2022-04-22 RX ORDER — TRIAMCINOLONE ACETONIDE 1 MG/G
CREAM TOPICAL 2 TIMES DAILY
Qty: 45 G | Refills: 1 | Status: SHIPPED | OUTPATIENT
Start: 2022-04-22 | End: 2023-12-06

## 2022-04-22 RX ORDER — LIDOCAINE HYDROCHLORIDE 20 MG/ML
JELLY TOPICAL
Qty: 30 ML | Refills: 2 | Status: SHIPPED | OUTPATIENT
Start: 2022-04-22 | End: 2022-04-22

## 2022-04-22 RX ORDER — LIDOCAINE HYDROCHLORIDE 20 MG/ML
JELLY TOPICAL
Qty: 30 ML | Refills: 2 | Status: SHIPPED | OUTPATIENT
Start: 2022-04-22 | End: 2023-12-06

## 2022-04-22 RX ORDER — BETAMETHASONE DIPROPIONATE 0.5 MG/G
CREAM TOPICAL 2 TIMES DAILY
Qty: 45 G | Refills: 1 | Status: SHIPPED | OUTPATIENT
Start: 2022-04-22 | End: 2023-12-06

## 2022-04-22 NOTE — PROGRESS NOTES
Subjective:      Patient ID: Yvette Reyes is a 36 y.o. female.    Chief Complaint: Plantar Fasciitis (R foot)    Painful discolored skin bothfeet.  Gradual onset, worsening over past several weeks, aggravated by increased weight bearing, shoe gear, pressure.  No previous medical treatment.  OTC pain med not helping. Denies trauma, surgery.    Review of Systems   Constitutional: Negative for chills, diaphoresis, fever, malaise/fatigue and night sweats.   Cardiovascular: Negative for claudication, cyanosis, leg swelling and syncope.   Skin: Positive for color change, rash and suspicious lesions. Negative for dry skin, nail changes and unusual hair distribution.   Musculoskeletal: Negative for falls, joint pain, joint swelling, muscle cramps, muscle weakness and stiffness.   Gastrointestinal: Negative for constipation, diarrhea, nausea and vomiting.   Neurological: Negative for brief paralysis, disturbances in coordination, focal weakness, numbness, paresthesias, sensory change and tremors.           Objective:      Physical Exam  Constitutional:       General: She is not in acute distress.     Appearance: She is well-developed. She is not diaphoretic.   Cardiovascular:      Pulses:           Popliteal pulses are 2+ on the right side and 2+ on the left side.        Dorsalis pedis pulses are 2+ on the right side and 2+ on the left side.        Posterior tibial pulses are 2+ on the right side and 2+ on the left side.      Comments: Capillary refill 3 seconds all toes/distal feet, all toes/both feet warm to touch.      Negative lymphadenopathy bilateral popliteal fossa and tarsal tunnel.      Negavie lower extremity edema bilateral.    Musculoskeletal:      Right ankle: No swelling, deformity, ecchymosis or lacerations. Normal range of motion. Normal pulse.      Right Achilles Tendon: Normal. No defects. Infante's test negative.   Lymphadenopathy:      Lower Body: No right inguinal adenopathy. No left inguinal  adenopathy.      Comments: Negative lymphadenopathy bilateral popliteal fossa and tarsal tunnel.    Negative lymphangitic streaking bilateral feet/ankles/legs.   Skin:     General: Skin is warm and dry.      Capillary Refill: Capillary refill takes 2 to 3 seconds.      Coloration: Skin is not pale.      Findings: No abrasion, bruising, burn, ecchymosis, erythema, laceration, lesion or rash.      Nails: There is no clubbing.      Comments: Serpiginous bordered erythematous macular plaqies plantar heel, posteiror heel, and medial arch right more than left with tenderness to palpation over the rash, but  without ulceration, drainage, pus, tracking, fluctuance, malodor, or cardinal signs infection.    Otherwise, Skin is normal age and health appropriate color, turgor, texture, and temperature bilateral lower extremities without ulceration, hyperpigmentation, discoloration, masses nodules or cords palpated.  No ecchymosis, erythema, edema, or cardinal signs of infection bilateral lower extremities.     Neurological:      Mental Status: She is alert and oriented to person, place, and time.      Sensory: No sensory deficit.      Motor: No tremor, atrophy or abnormal muscle tone.      Gait: Gait normal.      Deep Tendon Reflexes:      Reflex Scores:       Patellar reflexes are 2+ on the right side and 2+ on the left side.       Achilles reflexes are 2+ on the right side and 2+ on the left side.     Comments: Negative tinel sign to percussion sural, superficial peroneal, deep peroneal, saphenous, and posterior tibial nerves right and left ankles and feet.     Psychiatric:         Behavior: Behavior is cooperative.               Assessment:       Encounter Diagnoses   Name Primary?    Foot pain, bilateral Yes    Dermatitis          Plan:       Yvette was seen today for plantar fasciitis.    Diagnoses and all orders for this visit:    Foot pain, bilateral  -     X-Ray Foot Complete Bilateral; Future  -     Ambulatory  referral/consult to Dermatology; Future    Dermatitis  -     X-Ray Foot Complete Bilateral; Future  -     Ambulatory referral/consult to Dermatology; Future    Other orders  -     LIDOcaine HCL 2% (XYLOCAINE) 2 % jelly; Apply topically as needed. Apply topically once nightly to affected part of foot/feet.  -     triamcinolone acetonide 0.1% (KENALOG) 0.1 % cream; Apply topically 2 (two) times daily.      I counseled the patient on her conditions, their implications and medical management.        Xrays, lidocaine, kenalog cream, consult dermatology    Activity to tolerance in shoes of choice.          Follow up in about 1 month (around 5/22/2022).

## 2023-12-06 ENCOUNTER — OFFICE VISIT (OUTPATIENT)
Dept: PODIATRY | Facility: CLINIC | Age: 38
End: 2023-12-06
Payer: COMMERCIAL

## 2023-12-06 VITALS
HEART RATE: 70 BPM | HEIGHT: 62 IN | WEIGHT: 177.94 LBS | SYSTOLIC BLOOD PRESSURE: 133 MMHG | DIASTOLIC BLOOD PRESSURE: 86 MMHG | BODY MASS INDEX: 32.74 KG/M2

## 2023-12-06 DIAGNOSIS — M79.671 FOOT PAIN, BILATERAL: Primary | ICD-10-CM

## 2023-12-06 DIAGNOSIS — M72.2 PLANTAR FASCIITIS: ICD-10-CM

## 2023-12-06 DIAGNOSIS — M79.672 FOOT PAIN, BILATERAL: Primary | ICD-10-CM

## 2023-12-06 PROCEDURE — 99214 OFFICE O/P EST MOD 30 MIN: CPT | Mod: S$GLB,,, | Performed by: PODIATRIST

## 2023-12-06 PROCEDURE — 99214 PR OFFICE/OUTPT VISIT, EST, LEVL IV, 30-39 MIN: ICD-10-PCS | Mod: S$GLB,,, | Performed by: PODIATRIST

## 2023-12-06 PROCEDURE — 1160F RVW MEDS BY RX/DR IN RCRD: CPT | Mod: CPTII,S$GLB,, | Performed by: PODIATRIST

## 2023-12-06 PROCEDURE — 3079F DIAST BP 80-89 MM HG: CPT | Mod: CPTII,S$GLB,, | Performed by: PODIATRIST

## 2023-12-06 PROCEDURE — 99999 PR PBB SHADOW E&M-EST. PATIENT-LVL III: CPT | Mod: PBBFAC,,, | Performed by: PODIATRIST

## 2023-12-06 PROCEDURE — 1159F MED LIST DOCD IN RCRD: CPT | Mod: CPTII,S$GLB,, | Performed by: PODIATRIST

## 2023-12-06 PROCEDURE — 1159F PR MEDICATION LIST DOCUMENTED IN MEDICAL RECORD: ICD-10-PCS | Mod: CPTII,S$GLB,, | Performed by: PODIATRIST

## 2023-12-06 PROCEDURE — 99999 PR PBB SHADOW E&M-EST. PATIENT-LVL III: ICD-10-PCS | Mod: PBBFAC,,, | Performed by: PODIATRIST

## 2023-12-06 PROCEDURE — 3008F PR BODY MASS INDEX (BMI) DOCUMENTED: ICD-10-PCS | Mod: CPTII,S$GLB,, | Performed by: PODIATRIST

## 2023-12-06 PROCEDURE — 3075F SYST BP GE 130 - 139MM HG: CPT | Mod: CPTII,S$GLB,, | Performed by: PODIATRIST

## 2023-12-06 PROCEDURE — 3079F PR MOST RECENT DIASTOLIC BLOOD PRESSURE 80-89 MM HG: ICD-10-PCS | Mod: CPTII,S$GLB,, | Performed by: PODIATRIST

## 2023-12-06 PROCEDURE — 1160F PR REVIEW ALL MEDS BY PRESCRIBER/CLIN PHARMACIST DOCUMENTED: ICD-10-PCS | Mod: CPTII,S$GLB,, | Performed by: PODIATRIST

## 2023-12-06 PROCEDURE — 3075F PR MOST RECENT SYSTOLIC BLOOD PRESS GE 130-139MM HG: ICD-10-PCS | Mod: CPTII,S$GLB,, | Performed by: PODIATRIST

## 2023-12-06 PROCEDURE — 3008F BODY MASS INDEX DOCD: CPT | Mod: CPTII,S$GLB,, | Performed by: PODIATRIST

## 2023-12-06 RX ORDER — METHYLPREDNISOLONE 4 MG/1
TABLET ORAL
Qty: 1 EACH | Refills: 0 | Status: SHIPPED | OUTPATIENT
Start: 2023-12-06

## 2023-12-06 NOTE — PROGRESS NOTES
Subjective:      Patient ID: Yvette Reyes is a 38 y.o. female.    Chief Complaint:   Foot Pain (Plantar fasc, pain under the heels, pain is more intense than it has been, has started restricting daily activities)    Yvette is a 38 y.o. female who presents to the podiatry clinic  with complaint of  bilateral foot pain.   Patient relates that she is taking Tylenol when the pain is intense  She is had constant pain every day for few years however some days are worse than others  She is found mild improvement with insoles in her shoes as well as changing her shoe gear  She does work at home and often walks barefoot    Denies any hip knee or back pain    She is here today because the intensity over the 1-2 months has increased and changed left is the same as the right primarily pain with walking       Dr. Jones in 2022 pain has worsened since then    X-Ray Foot Complete Bilateral  Narrative: EXAMINATION:  XR FOOT COMPLETE 3 VIEW BILATERAL    CLINICAL HISTORY:  Pain in right foot    TECHNIQUE:  AP, lateral, and oblique views of both feet were performed.    COMPARISON:  None    FINDINGS:  5 mm bone island density distal 2nd right metatarsal marrow space.  Mild spurring anterior malleoli, moderate heel spur and hypertrophic change of Achilles tendon insertion sites.  Impression: No fracture dislocation.  Additional findings above.    Electronically signed by: Anil Rdz MD  Date:    04/22/2022  Time:    10:39     Past Medical History:   Diagnosis Date    Short cervix --- VAGINAL PROGESTERONE 7/20/2017     History reviewed. No pertinent surgical history.  Current Outpatient Medications on File Prior to Visit   Medication Sig Dispense Refill    [DISCONTINUED] betamethasone dipropionate 0.05 % cream Apply topically 2 (two) times daily. for 10 days 45 g 1    [DISCONTINUED] LIDOcaine HCL 2% (XYLOCAINE) 2 % jelly Apply topically as needed. Apply topically once nightly to affected part of foot/feet. (Patient not  "taking: Reported on 12/6/2023) 30 mL 2    [DISCONTINUED] triamcinolone acetonide 0.1% (KENALOG) 0.1 % cream Apply topically 2 (two) times daily. (Patient not taking: Reported on 12/6/2023) 45 g 1     No current facility-administered medications on file prior to visit.     Review of patient's allergies indicates:   Allergen Reactions    Motrin [ibuprofen] Other (See Comments)     Pt reports that it makes her "feel weird"       Review of Systems   Constitutional: Negative for chills, decreased appetite, fever, malaise/fatigue, night sweats, weight gain and weight loss.   Cardiovascular:  Negative for chest pain, claudication, dyspnea on exertion, leg swelling, palpitations and syncope.   Respiratory:  Negative for cough and shortness of breath.    Endocrine: Negative for cold intolerance and heat intolerance.   Hematologic/Lymphatic: Negative for bleeding problem. Does not bruise/bleed easily.   Skin:  Negative for color change, dry skin, flushing, itching, nail changes, poor wound healing, rash, skin cancer, suspicious lesions and unusual hair distribution.   Musculoskeletal:  Negative for arthritis, back pain, falls, gout, joint pain, joint swelling, muscle cramps, muscle weakness, myalgias, neck pain and stiffness.        Heel pains   Gastrointestinal:  Negative for diarrhea, nausea and vomiting.   Neurological:  Negative for dizziness, focal weakness, light-headedness, numbness, paresthesias, tremors, vertigo and weakness.   Psychiatric/Behavioral:  Negative for altered mental status and depression. The patient does not have insomnia.    Allergic/Immunologic: Negative.            Objective:       Vitals:    12/06/23 1053   BP: 133/86   Pulse: 70   Weight: 80.7 kg (177 lb 14.6 oz)   Height: 5' 2" (1.575 m)   PainSc:   6   PainLoc: Foot   80.7 kg (177 lb 14.6 oz)     Physical Exam  Vitals reviewed.   Constitutional:       General: She is not in acute distress.     Appearance: She is well-developed. She is not " ill-appearing, toxic-appearing or diaphoretic.      Comments: Proper supportive shoegear      Cardiovascular:      Pulses:           Dorsalis pedis pulses are 2+ on the right side and 2+ on the left side.        Posterior tibial pulses are 2+ on the right side and 2+ on the left side.   Musculoskeletal:         General: No tenderness or deformity.      Right lower leg: No edema.      Left lower leg: No edema.      Right ankle: Normal.      Right Achilles Tendon: Normal.      Left ankle: Normal.      Left Achilles Tendon: Normal.      Right foot: Decreased range of motion. No deformity.      Left foot: Decreased range of motion. No deformity.      Comments: Sharp deep pain to palpation inferior heel b/l at medial calcaneal tubercle without ecchymosis, erythema, edema, or cardinal signs infection, and no signs of trauma.    Mild discomfort with heel raise  No pain along posterior tibial tendon, Achilles or ankle  No pain to FHL    Strength 5/5 bilateral    Feet:      Right foot:      Protective Sensation: 10 sites tested.  10 sites sensed.      Skin integrity: No ulcer, blister, skin breakdown, erythema, warmth, callus or dry skin.      Toenail Condition: Right toenails are normal.      Left foot:      Protective Sensation: 10 sites tested.  10 sites sensed.      Skin integrity: No ulcer, blister, skin breakdown, erythema, warmth, callus or dry skin.      Toenail Condition: Left toenails are normal.   Skin:     General: Skin is warm.      Capillary Refill: Capillary refill takes 2 to 3 seconds.      Coloration: Skin is not pale.      Findings: No erythema or rash.   Neurological:      Mental Status: She is alert and oriented to person, place, and time.      Sensory: No sensory deficit.      Gait: Gait is intact.   Psychiatric:         Attention and Perception: Attention normal.         Mood and Affect: Mood normal.         Speech: Speech normal.         Behavior: Behavior normal.         Thought Content: Thought  content normal.         Cognition and Memory: Cognition normal.         Judgment: Judgment normal.               Assessment:       Encounter Diagnoses   Name Primary?    Foot pain, bilateral Yes    Plantar fasciitis          Plan:       Yvette was seen today for foot pain.    Diagnoses and all orders for this visit:    Foot pain, bilateral  -     Ambulatory referral/consult to Physical/Occupational Therapy; Future    Plantar fasciitis  -     Ambulatory referral/consult to Physical/Occupational Therapy; Future    Other orders  -     methylPREDNISolone (MEDROL DOSEPACK) 4 mg tablet; use as directed      I counseled the patient on her conditions, their implications and medical management.    Reviewed xrays/chart review     Given stretching exercises to help stretch the tight Achilles complex contributing to plantar fasciitis.    Rx  formal physical therapy       Patient encouraged to purchase good supportive shoes and spenco rx inserts.    Handout dispensed with information in regards to several type of Spenco Inserts along with purchasing information.     Avoid barefoot    Consider injection, MRI, immobilization,   RTC in Follow up in about 4 weeks (around 1/3/2024).                Follow up in about 4 weeks (around 1/3/2024).

## 2024-04-30 ENCOUNTER — HOSPITAL ENCOUNTER (OUTPATIENT)
Dept: RADIOLOGY | Facility: HOSPITAL | Age: 39
Discharge: HOME | End: 2024-04-30
Attending: ORTHOPAEDIC SURGERY
Payer: COMMERCIAL

## 2024-04-30 ENCOUNTER — OFFICE VISIT (OUTPATIENT)
Dept: SPORTS MEDICINE | Facility: CLINIC | Age: 39
End: 2024-04-30
Payer: COMMERCIAL

## 2024-04-30 VITALS
SYSTOLIC BLOOD PRESSURE: 115 MMHG | HEIGHT: 62 IN | WEIGHT: 191.13 LBS | HEART RATE: 59 BPM | DIASTOLIC BLOOD PRESSURE: 73 MMHG | BODY MASS INDEX: 35.17 KG/M2

## 2024-04-30 DIAGNOSIS — M25.561 RIGHT KNEE PAIN, UNSPECIFIED CHRONICITY: ICD-10-CM

## 2024-04-30 DIAGNOSIS — M25.561 ACUTE PAIN OF RIGHT KNEE: ICD-10-CM

## 2024-04-30 DIAGNOSIS — M94.261 CHONDROMALACIA OF RIGHT KNEE: ICD-10-CM

## 2024-04-30 DIAGNOSIS — M23.91 ACUTE INTERNAL DERANGEMENT OF RIGHT KNEE: Primary | ICD-10-CM

## 2024-04-30 PROCEDURE — 1159F MED LIST DOCD IN RCRD: CPT | Mod: CPTII,S$GLB,, | Performed by: ORTHOPAEDIC SURGERY

## 2024-04-30 PROCEDURE — 99999 PR PBB SHADOW E&M-EST. PATIENT-LVL III: CPT | Mod: PBBFAC,,, | Performed by: ORTHOPAEDIC SURGERY

## 2024-04-30 PROCEDURE — 99204 OFFICE O/P NEW MOD 45 MIN: CPT | Mod: S$GLB,,, | Performed by: ORTHOPAEDIC SURGERY

## 2024-04-30 PROCEDURE — 3008F BODY MASS INDEX DOCD: CPT | Mod: CPTII,S$GLB,, | Performed by: ORTHOPAEDIC SURGERY

## 2024-04-30 PROCEDURE — 3074F SYST BP LT 130 MM HG: CPT | Mod: CPTII,S$GLB,, | Performed by: ORTHOPAEDIC SURGERY

## 2024-04-30 PROCEDURE — 73564 X-RAY EXAM KNEE 4 OR MORE: CPT | Mod: 26,RT,, | Performed by: RADIOLOGY

## 2024-04-30 PROCEDURE — 3078F DIAST BP <80 MM HG: CPT | Mod: CPTII,S$GLB,, | Performed by: ORTHOPAEDIC SURGERY

## 2024-04-30 PROCEDURE — 73562 X-RAY EXAM OF KNEE 3: CPT | Mod: 26,59,LT, | Performed by: RADIOLOGY

## 2024-04-30 PROCEDURE — 73564 X-RAY EXAM KNEE 4 OR MORE: CPT | Mod: TC,RT

## 2024-04-30 PROCEDURE — 73562 X-RAY EXAM OF KNEE 3: CPT | Mod: TC,LT

## 2024-04-30 RX ORDER — MELOXICAM 15 MG/1
15 TABLET ORAL DAILY
Qty: 30 TABLET | Refills: 0 | Status: SHIPPED | OUTPATIENT
Start: 2024-04-30

## 2024-04-30 NOTE — PROGRESS NOTES
CC: Right knee pain    38 y.o. Female who presents as a new patient to me. 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  Her .  Complaint is right knee pain for the last few weeks of acute onset with a reported twisting injury mechanism while sitting down.  She now has fairly significant pain that bothers her at rest and with bent knee activities.  She has difficulty with prolonged standing and walking and getting up and downstairs.  She does describes some mechanical symptoms.  She states last night the pain worsened significantly.  She reports difficulty sleeping.  She does note that she started working out about 2 months ago with more frequency and intensity which maybe related.   Pain currently localizes medially. Treatment thus far has included rest, self-directed therapy, oral medication, and activity modifications. Here today to discuss diagnosis and treatment options.      No ipsilateral groin or hip pain.  No back or radicular symptoms.  She denies any pre-existing issues with her right knee.    BMI 35    REVIEW OF SYSTEMS:   Constitution: Negative. Negative for chills, fever and night sweats.    Hematologic/Lymphatic: Negative for bleeding problem. Does not bruise/bleed easily.   Skin: Negative for dry skin, itching and rash.   Musculoskeletal: Negative for falls. Positive for right knee pain and muscle weakness.     All other review of symptoms were reviewed and found to be noncontributory.     PAST MEDICAL HISTORY:   Past Medical History:   Diagnosis Date    Short cervix --- VAGINAL PROGESTERONE 7/20/2017     PAST SURGICAL HISTORY:   No past surgical history on file.    FAMILY HISTORY:   Family History   Problem Relation Name Age of Onset    Breast cancer Neg Hx      Colon cancer Neg Hx      Ovarian cancer Neg Hx       SOCIAL HISTORY:   Social History     Socioeconomic History    Marital status:    Tobacco Use    Smoking status: Never    Smokeless tobacco: Never   Substance and Sexual  "Activity    Alcohol use: No    Drug use: No    Sexual activity: Yes     Partners: Male     Birth control/protection: None     MEDICATIONS:     Current Outpatient Medications:     meloxicam (MOBIC) 15 MG tablet, Take 1 tablet (15 mg total) by mouth once daily., Disp: 30 tablet, Rfl: 0    methylPREDNISolone (MEDROL DOSEPACK) 4 mg tablet, use as directed (Patient not taking: Reported on 4/30/2024), Disp: 1 each, Rfl: 0    ALLERGIES:   Review of patient's allergies indicates:   Allergen Reactions    Motrin [ibuprofen] Other (See Comments)     Pt reports that it makes her "feel weird"      PHYSICAL EXAMINATION:  /73   Pulse (!) 59   Ht 5' 2" (1.575 m)   Wt 86.7 kg (191 lb 2.2 oz)   BMI 34.96 kg/m²   General: Well-developed well-nourished 38 y.o. femalein no acute distress   Cardiovascular: Regular rhythm by palpation of distal pulse, normal color and temperature, no concerning varicosities on symptomatic side   Lungs: No labored breathing or wheezing appreciated   Neuro: Alert and oriented ×3   Psychiatric: well oriented to person, place and time, demonstrates normal mood and affect   Skin: No rashes, lesions or ulcers, normal temperature, turgor, and texture on involved extremity    Ortho/SPM Exam  Examination of the right knee demonstrates intact extensor mechanism.  Small effusion.  Significant pain to palpation over the medial joint line more so than in the pes insertion but she is tender over both locations.  Pain medially with varus load and she has significant medially based pain with Domingo's testing.  I can palpate a click as well on Domingo's testing.  Lateral joint line pain to palpation but less significant.  Negative Lachman.  Negative posterior drawer.  Stable to varus and valgus stress.  Lateral patellar maltracking.  No significant peripatellar tenderness.  Negative patellar apprehension.  Weak quad and hip abductors.  No pain with passive internal and external rotation of the right hip.  " Negative straight leg raise.  Range of motion is from near full extension to 100° of flexion with limitation due to reported pain and guarding.    IMAGING:  X-rays including standing, weight bearing AP and flexion bilateral knees, RIGHT knee lateral and sunrise views ordered and images reviewed by me show:    Mild degenerative changes over the medial compartment without significant joint space narrowing    ASSESSMENT:      ICD-10-CM ICD-9-CM   1. Acute internal derangement of right knee  M23.91 717.9   2. Acute pain of right knee  M25.561 719.46   3. Chondromalacia of right knee  M94.261 717.7       PLAN:     -Findings and treatment options were discussed with the patient and her .  She has significant medially based knee pain with mechanical symptoms both by history and on exam.  Concerning for a medial meniscus tear.  She does appear to have some underlying chondromalacia.  Given her current degree of pain and problems with the acute injury, I have recommended advanced imaging for further assessment.  -Right Knee MRI (In person)  -Mobic  -RTC after imaging to discuss results and treatment options  -All questions answered    Procedures

## 2024-10-03 ENCOUNTER — E-VISIT (OUTPATIENT)
Dept: PRIMARY CARE CLINIC | Facility: CLINIC | Age: 39
End: 2024-10-03
Payer: COMMERCIAL

## 2024-10-03 ENCOUNTER — NURSE TRIAGE (OUTPATIENT)
Dept: ADMINISTRATIVE | Facility: CLINIC | Age: 39
End: 2024-10-03
Payer: COMMERCIAL

## 2024-10-03 DIAGNOSIS — H11.31 SUBCONJUNCTIVAL HEMORRHAGE, RIGHT: Primary | ICD-10-CM

## 2024-10-03 NOTE — TELEPHONE ENCOUNTER
"Right eye right side sclera redness.  Red all day now its a darker  Reason for Disposition   Bleeding on white of the eye    Additional Information   Negative: Chemical got in the eye   Negative: Piece of something got in the eye   Negative: Eye redness followed an eye injury   Negative: White, yellow, or green pus in the eyes   Negative: Eyelashes stuck together (matting) from pus   Negative: [1] Eyelid swelling AND [2] no redness of white of eye (sclera)   Negative: Caused by pollen or other allergy OR main symptom is itchy eyes   Negative: Suspected reaction to antibiotic eye drops   Negative: Red, widespread rash also present   Negative: SEVERE eye pain (e.g., excruciating pain and patient unable to do normal activities)   Negative: [1] Eyelid is very swollen (shut or almost) AND [2] fever   Negative: [1] Eyelid (outer) is very red (or tender to touch) AND [2] fever   Negative: [1] Foreign body sensation ("feels like something is in there") AND [2] no improvement after irrigation (regardless of duration of flushing)   Negative: Vomiting   Negative: [1] Recent eye surgery AND [2] increasing eye pain   Negative: Patient sounds very sick or weak to the triager   Negative: MODERATE eye pain or discomfort (e.g., interferes with normal activities or awakens from sleep; more than mild)   Negative: New or worsening blurred vision   Negative: Looking at light causes MODERATE to SEVERE eye pain (i.e., photophobia)   Negative: Cloudy spot or sore seen on the cornea (clear part of the eye)   Negative: Eyelid is very swollen (shut or almost)   Negative: Eyelid is red and painful (or tender to touch)   Negative: Eye pain present > 24 hours   Negative: [1] Bleeding on white of the eye AND [2] taking Coumadin (warfarin) or other strong blood thinner, or known bleeding disorder (e.g., thrombocytopenia)    Protocols used: Eye - Redness-A-AH    "

## 2024-10-03 NOTE — PROGRESS NOTES
Patient ID: Yvette Reyes is a 38 y.o. female.    Chief Complaint: General Illness (Entered automatically based on patient selection in [employer].)    The patient initiated a request through [employer] on 10/3/2024 for evaluation and management with a chief complaint of General Illness (Entered automatically based on patient selection in [employer].)     I evaluated the questionnaire submission on 10/3/24.    [facility] E[facility]-Common Problems    10/3/2024  8:49 AM CDT - Filed by Patient   What do you need help with? Red Eye   Do you agree to participate in an E-Visit? Yes   If you have any of the following symptoms, please present to your local emergency room or call 911:  I acknowledge   Are you pregnant, could you be pregnant, or are you breast feeding? None of the above   What is the main issue you would like addressed today? Redness on the right side of my right eye   Which of the following have you been experiencing? One eye is red   Have you had any of the following? None of the above    How long have you been having these symptoms? Just today   Do you have a fever? No, I do not have a fever   Are your symptoms associated with swimming? No, I have not been swimming recently   Have your eyes been exposed to any chemicals, creams, or drops that may be causing irritation? No   Have you suffered any recent injury to your eyes? No   Have you been exposed to anyone with similar symptoms? No   Did or do you wear contact lenses? No   Have you had any of the following? None of the above   Have you had any of the following in the past? I have not had any past problems with my eyes.   What medications are you currently using for these symptoms? Nothing   Provide any additional information you feel is important. Its blood red on the right side of my right eye ball   At least one photo is required for treatment to be provided. You can upload a maximum of three photos of the affected area.     Are you able to take  your vital signs? No         Encounter Diagnosis   Name Primary?    Subconjunctival hemorrhage, right Yes        No orders of the defined types were placed in this encounter.           No follow-ups on file.      E-Visit Time Tracking:    Day 1 Time (in minutes): 8    Total Time (in minutes): 8

## (undated) DEVICE — NDL 22GA X1 1/2 REG BEVEL

## (undated) DEVICE — POWDER ARISTA AH 1GM

## (undated) DEVICE — GOWN SURGICAL X-LARGE

## (undated) DEVICE — ELECTRODE REM PLYHSV RETURN 9

## (undated) DEVICE — RETRACTOR LONE STAR 14.1X14.1

## (undated) DEVICE — SUT 3/0 27IN PDS II VIO MO

## (undated) DEVICE — SUT VICRYL 3-0 27 SH

## (undated) DEVICE — PANTIES FEMININE NAPKIN LG/XLG

## (undated) DEVICE — SEE MEDLINE ITEM 146417

## (undated) DEVICE — LUBRICANT SURGILUBE 2 OZ

## (undated) DEVICE — SYR ONLY LUER LOCK 20CC

## (undated) DEVICE — GAUZE SPONGE 4X4 12PLY

## (undated) DEVICE — SEE MEDLINE ITEM 157148

## (undated) DEVICE — BRIEF MESH LARGE

## (undated) DEVICE — BANDAGE KERLIX P/P 2.25IN STER

## (undated) DEVICE — HOOK STAY ELAS 5MM 8EA/PK

## (undated) DEVICE — SOL BETADINE 5%

## (undated) DEVICE — SUT VICRYL PLUS 3-0 SH 18IN

## (undated) DEVICE — SEE MEDLINE ITEM 152622

## (undated) DEVICE — TRAY MINOR GEN SURG